# Patient Record
Sex: FEMALE | Race: WHITE
[De-identification: names, ages, dates, MRNs, and addresses within clinical notes are randomized per-mention and may not be internally consistent; named-entity substitution may affect disease eponyms.]

---

## 2017-12-15 ENCOUNTER — HOSPITAL ENCOUNTER (EMERGENCY)
Dept: HOSPITAL 76 - ED | Age: 82
Discharge: HOME | End: 2017-12-15
Payer: MEDICARE

## 2017-12-15 ENCOUNTER — HOSPITAL ENCOUNTER (OUTPATIENT)
Dept: HOSPITAL 76 - EMS | Age: 82
Discharge: ANOTHER HOSPITAL | End: 2017-12-15
Attending: SURGERY
Payer: MEDICARE

## 2017-12-15 VITALS — SYSTOLIC BLOOD PRESSURE: 171 MMHG | DIASTOLIC BLOOD PRESSURE: 121 MMHG

## 2017-12-15 DIAGNOSIS — E03.9: ICD-10-CM

## 2017-12-15 DIAGNOSIS — R42: Primary | ICD-10-CM

## 2017-12-15 DIAGNOSIS — E86.0: Primary | ICD-10-CM

## 2017-12-15 DIAGNOSIS — R94.31: ICD-10-CM

## 2017-12-15 DIAGNOSIS — I10: ICD-10-CM

## 2017-12-15 DIAGNOSIS — F03.90: ICD-10-CM

## 2017-12-15 DIAGNOSIS — I45.10: ICD-10-CM

## 2017-12-15 LAB
ALBUMIN/GLOB SERPL: 1 {RATIO} (ref 1–2.2)
ANION GAP SERPL CALCULATED.4IONS-SCNC: 13 MMOL/L (ref 6–13)
BASOPHILS NFR BLD AUTO: 0.1 10^3/UL (ref 0–0.1)
BASOPHILS NFR BLD AUTO: 0.8 %
BILIRUB BLD-MCNC: 0.2 MG/DL (ref 0.2–1)
BUN SERPL-MCNC: 27 MG/DL (ref 6–20)
CALCIUM UR-MCNC: 9.3 MG/DL (ref 8.5–10.3)
CHLORIDE SERPL-SCNC: 102 MMOL/L (ref 101–111)
CO2 SERPL-SCNC: 24 MMOL/L (ref 21–32)
CREAT SERPLBLD-SCNC: 1.3 MG/DL (ref 0.4–1)
CUL URINE ADD CHARGE: (no result)
EOSINOPHIL # BLD AUTO: 0.1 10^3/UL (ref 0–0.7)
EOSINOPHIL NFR BLD AUTO: 0.7 %
ERYTHROCYTE [DISTWIDTH] IN BLOOD BY AUTOMATED COUNT: 16 % (ref 12–15)
GFRSERPLBLD MDRD-ARVRAT: 39 ML/MIN/{1.73_M2} (ref 89–?)
GLOBULIN SER-MCNC: 4 G/DL (ref 2.1–4.2)
GLUCOSE SERPL-MCNC: 110 MG/DL (ref 70–100)
HCT VFR BLD AUTO: 32 % (ref 37–47)
HGB UR QL STRIP: 10.5 G/DL (ref 12–16)
LIPASE SERPL-CCNC: 36 U/L (ref 22–51)
LYMPHOCYTES # SPEC AUTO: 0.8 10^3/UL (ref 1.5–3.5)
LYMPHOCYTES NFR BLD AUTO: 9 %
MCH RBC QN AUTO: 27.7 PG (ref 27–31)
MCHC RBC AUTO-ENTMCNC: 32.7 G/DL (ref 32–36)
MCV RBC AUTO: 84.8 FL (ref 81–99)
MONOCYTES # BLD AUTO: 0.8 10^3/UL (ref 0–1)
MONOCYTES NFR BLD AUTO: 8.9 %
NEUTROPHILS # BLD AUTO: 6.9 10^3/UL (ref 1.5–6.6)
NEUTROPHILS # SNV AUTO: 8.5 X10^3/UL (ref 4.8–10.8)
NEUTROPHILS NFR BLD AUTO: 80.6 %
NRBC # BLD AUTO: 0.1 /100WBC
PDW BLD AUTO: 7.7 FL (ref 7.9–10.8)
PH UR STRIP.AUTO: 6 PH (ref 5–7.5)
POTASSIUM SERPL-SCNC: 4.4 MMOL/L (ref 3.5–5)
PROT SPEC-MCNC: 7.9 G/DL (ref 6.7–8.2)
RBC MAR: 3.78 10^6/UL (ref 4.2–5.4)
SODIUM SERPLBLD-SCNC: 139 MMOL/L (ref 135–145)
SP GR UR STRIP.AUTO: 1.01 (ref 1–1.03)
UA CHARGE (STRIP ONLY): YES
UA W/ MICROSCOPIC CHARGE: (no result)
UR CULTURE IF IND: (no result)
UROBILINOGEN UR STRIP.AUTO-MCNC: NEGATIVE MG/DL
WBC # BLD: 8.5 X10^3/UL

## 2017-12-15 PROCEDURE — 85025 COMPLETE CBC W/AUTO DIFF WBC: CPT

## 2017-12-15 PROCEDURE — 81003 URINALYSIS AUTO W/O SCOPE: CPT

## 2017-12-15 PROCEDURE — 93005 ELECTROCARDIOGRAM TRACING: CPT

## 2017-12-15 PROCEDURE — 87086 URINE CULTURE/COLONY COUNT: CPT

## 2017-12-15 PROCEDURE — 84484 ASSAY OF TROPONIN QUANT: CPT

## 2017-12-15 PROCEDURE — 80053 COMPREHEN METABOLIC PANEL: CPT

## 2017-12-15 PROCEDURE — 83690 ASSAY OF LIPASE: CPT

## 2017-12-15 PROCEDURE — 96360 HYDRATION IV INFUSION INIT: CPT

## 2017-12-15 PROCEDURE — 36415 COLL VENOUS BLD VENIPUNCTURE: CPT

## 2017-12-15 PROCEDURE — 99284 EMERGENCY DEPT VISIT MOD MDM: CPT

## 2017-12-15 PROCEDURE — 81001 URINALYSIS AUTO W/SCOPE: CPT

## 2017-12-15 NOTE — ED PHYSICIAN DOCUMENTATION
History of Present Illness





- Stated complaint


Stated Complaint: DIZZY





- Chief complaint


Chief Complaint: General





- History obtained from


History obtained from: Patient, Family





- History of Present Illness


Timing: Today





- Additonal information


Additional information: 





84-year-old female with history of dementia has called the ambulance today with 

a chief complaint of dizziness and not feeling well.  Now that she is here in 

the emergency department she cannot recall why she called the ambulance and she 

does not have any specific complaints.  She does state that she feels somewhat 

dehydrated and does not feel right.  The family is here with her and they 

indicate that she has refused assisted living so far and that she is 

incontinent of urine at night and does not eat and drink well.  They do bring 

her food they are at her home frequently and she does have a caregiver that 

comes in as well.





Review of Systems


Constitutional: denies: Fever, Chills, Myalgias, Fatigue


Eyes: denies: Decreased vision


Ears: denies: Ear pain


Nose: denies: Congestion


Throat: denies: Sore throat


Cardiac: denies: Chest pain / pressure, Palpitations


Respiratory: denies: Dyspnea, Cough


GI: denies: Abdominal Pain, Nausea, Vomiting


: denies: Dysuria, Frequency


Skin: denies: Rash


Musculoskeletal: denies: Neck pain, Back pain, Extremity pain


Neurologic: denies: Generalized weakness, Focal weakness, Numbness





PD PAST MEDICAL HISTORY





- Past Medical History


Cardiovascular: Hypertension


Neuro: None


Endocrine/Autoimmune: HyPOthyroidism





- Past Surgical History


Past Surgical History: No


Ortho: Knee replacement





- Present Medications


Home Medications: 


 Ambulatory Orders











 Medication  Instructions  Recorded  Confirmed


 


Famotidine 20 mg PO DAILY #30 tablet 09/19/16 12/15/17


 


Levothyroxine [Synthroid] 150 mcg ORAL DAILY 09/19/16 12/15/17


 


Simvastatin 20 mg ORAL DAILY 12/25/16 12/15/17


 


Valsartan 40 mg ORAL DAILY 12/25/16 12/15/17














- Allergies


Allergies/Adverse Reactions: 


 Allergies











Allergy/AdvReac Type Severity Reaction Status Date / Time


 


No Known Drug Allergies Allergy   Verified 12/25/16 12:40














- Social History


Does the pt smoke?: No


Smoking Status: Never smoker


Does the pt drink ETOH?: No


Does the pt have substance abuse?: No





- Immunizations


Immunizations are current?: Yes





PD ED PE NORMAL





- Vitals


Vital signs reviewed: Yes (Hypertensive)





- General


General: No acute distress, Well developed/nourished





- HEENT


HEENT: Atraumatic, PERRL, EOMI, Ears normal, Other (Dry mucous membranes)





- Neck


Neck: Supple, no meningeal sign, No bony TTP





- Cardiac


Cardiac: RRR, No murmur





- Respiratory


Respiratory: No respiratory distress, Clear bilaterally





- Abdomen


Abdomen: Soft, Non tender





- Back


Back: No CVA TTP, No spinal TTP





- Derm


Derm: Normal color, Warm and dry, No rash





- Extremities


Extremities: No deformity, No edema





- Neuro


Neuro: No motor deficit, No sensory deficit


Eye Opening: Spontaneous


Motor: Obeys Commands


Verbal: Oriented


GCS Score: 15





- Psych


Psych: Normal mood, Normal affect





Results





- Vitals


Vitals: 


 Vital Signs - 24 hr











  12/15/17 12/15/17 12/15/17





  09:47 10:42 12:02


 


Temperature 36.7 C 36.8 C 36.8 C


 


Heart Rate 64 65 70


 


Respiratory 17 24 20





Rate   


 


Blood Pressure 159/82 H 143/87 H 159/93 H


 


O2 Saturation 95 99 95








 Oxygen











O2 Source                      Room air

















- EKG (time done)


  ** 0954


Rate: Rate (enter#) (62)


Intervals: RBBB


QRS: LVH


Compare to prior EKG: Unchanged from prior EKG (12-25-16)


Computer interpretation: Agree with computer





- Labs


Labs: 


 Laboratory Tests











  12/15/17 12/15/17 12/15/17





  12:08 12:08 12:08


 


WBC  8.5  


 


RBC  3.78 L  


 


Hgb  10.5 L  


 


Hct  32.0 L  


 


MCV  84.8  


 


MCH  27.7  


 


MCHC  32.7  


 


RDW  16.0 H  


 


Plt Count  402  


 


MPV  7.7 L  


 


Neut #  6.9 H  


 


Lymph #  0.8 L  


 


Mono #  0.8  


 


Eos #  0.1  


 


Baso #  0.1  


 


Absolute Nucleated RBC  0.01  


 


Nucleated RBC %  0.1  


 


Sodium   139 


 


Potassium   4.4 


 


Chloride   102 


 


Carbon Dioxide   24 


 


Anion Gap   13.0 


 


BUN   27 H 


 


Creatinine   1.3 H 


 


Estimated GFR (MDRD)   39 L 


 


Glucose   110 H 


 


Calcium   9.3 


 


Total Bilirubin   0.2 


 


AST   20 


 


ALT   11 


 


Alkaline Phosphatase   131 H 


 


Troponin I    < 0.04


 


Total Protein   7.9 


 


Albumin   3.9 


 


Globulin   4.0 


 


Albumin/Globulin Ratio   1.0 


 


Lipase   36 


 


Urine Color   


 


Urine Clarity   


 


Urine pH   


 


Ur Specific Gravity   


 


Urine Protein   


 


Urine Glucose (UA)   


 


Urine Ketones   


 


Urine Occult Blood   


 


Urine Nitrite   


 


Urine Bilirubin   


 


Urine Urobilinogen   


 


Ur Leukocyte Esterase   


 


Ur Microscopic Review   


 


Urine Culture Comments   














  12/15/17





  12:35


 


WBC 


 


RBC 


 


Hgb 


 


Hct 


 


MCV 


 


MCH 


 


MCHC 


 


RDW 


 


Plt Count 


 


MPV 


 


Neut # 


 


Lymph # 


 


Mono # 


 


Eos # 


 


Baso # 


 


Absolute Nucleated RBC 


 


Nucleated RBC % 


 


Sodium 


 


Potassium 


 


Chloride 


 


Carbon Dioxide 


 


Anion Gap 


 


BUN 


 


Creatinine 


 


Estimated GFR (MDRD) 


 


Glucose 


 


Calcium 


 


Total Bilirubin 


 


AST 


 


ALT 


 


Alkaline Phosphatase 


 


Troponin I 


 


Total Protein 


 


Albumin 


 


Globulin 


 


Albumin/Globulin Ratio 


 


Lipase 


 


Urine Color  YELLOW


 


Urine Clarity  CLEAR


 


Urine pH  6.0


 


Ur Specific Gravity  1.015


 


Urine Protein  TRACE


 


Urine Glucose (UA)  NEGATIVE


 


Urine Ketones  NEGATIVE


 


Urine Occult Blood  TRACE-INTA


 


Urine Nitrite  NEGATIVE


 


Urine Bilirubin  NEGATIVE


 


Urine Urobilinogen  0.2 (NORMAL)


 


Ur Leukocyte Esterase  NEGATIVE


 


Ur Microscopic Review  NOT INDICATED


 


Urine Culture Comments  NOT INDICATED














Procedures





- IVC sono (time)


  ** 1150


Bedside IVC sono: IVC measures (cm) (1.19), IVC collapsed c insp (cm) (complete)

, Dehydration





PD MEDICAL DECISION MAKING





- ED course


Complexity details: reviewed old records, reviewed results, re-evaluated patient

, considered differential, d/w patient, d/w family


ED course: 





84-year-old female with a history of progressing dementia has come to the 

emergency department today with dizziness as a chief complaint.  She is found 

to be dehydrated on interrogation of the inferior vena cava and she is 

administered a liter of normal saline intravenously.





Departure





- Departure


Disposition: 01 Home, Self Care


Clinical Impression: 


 Dehydration





Condition: Stable


Instructions:  ED Dehydration


Follow-Up: 


Altaf Almendarez MD [Primary Care Provider] -

## 2018-04-14 ENCOUNTER — HOSPITAL ENCOUNTER (INPATIENT)
Dept: HOSPITAL 76 - ED | Age: 83
LOS: 5 days | Discharge: NURSING, REHAB OR LONG-TERM CARE | DRG: 194 | End: 2018-04-19
Attending: NURSE PRACTITIONER | Admitting: INTERNAL MEDICINE
Payer: MEDICARE

## 2018-04-14 ENCOUNTER — HOSPITAL ENCOUNTER (OUTPATIENT)
Dept: HOSPITAL 76 - EMS | Age: 83
Discharge: ANOTHER HOSPITAL | End: 2018-04-14
Attending: SURGERY
Payer: MEDICARE

## 2018-04-14 DIAGNOSIS — E03.9: ICD-10-CM

## 2018-04-14 DIAGNOSIS — F03.90: ICD-10-CM

## 2018-04-14 DIAGNOSIS — E87.1: ICD-10-CM

## 2018-04-14 DIAGNOSIS — R50.9: ICD-10-CM

## 2018-04-14 DIAGNOSIS — Z96.659: ICD-10-CM

## 2018-04-14 DIAGNOSIS — Z79.899: ICD-10-CM

## 2018-04-14 DIAGNOSIS — Z78.1: ICD-10-CM

## 2018-04-14 DIAGNOSIS — D50.9: ICD-10-CM

## 2018-04-14 DIAGNOSIS — N18.3: ICD-10-CM

## 2018-04-14 DIAGNOSIS — R05: Primary | ICD-10-CM

## 2018-04-14 DIAGNOSIS — J18.1: ICD-10-CM

## 2018-04-14 DIAGNOSIS — R09.02: Primary | ICD-10-CM

## 2018-04-14 DIAGNOSIS — I12.9: ICD-10-CM

## 2018-04-14 LAB
ALBUMIN DIAFP-MCNC: 3.1 G/DL (ref 3.2–5.5)
ALBUMIN/GLOB SERPL: 0.7 {RATIO} (ref 1–2.2)
ALP SERPL-CCNC: 122 IU/L (ref 42–121)
ALT SERPL W P-5'-P-CCNC: 15 IU/L (ref 10–60)
ANION GAP SERPL CALCULATED.4IONS-SCNC: 9 MMOL/L (ref 6–13)
AST SERPL W P-5'-P-CCNC: 17 IU/L (ref 10–42)
BASOPHILS NFR BLD AUTO: 0 10^3/UL (ref 0–0.1)
BASOPHILS NFR BLD AUTO: 0.2 %
BILIRUB BLD-MCNC: 0.6 MG/DL (ref 0.2–1)
BUN SERPL-MCNC: 25 MG/DL (ref 6–20)
CALCIUM UR-MCNC: 8.5 MG/DL (ref 8.5–10.3)
CHLORIDE SERPL-SCNC: 99 MMOL/L (ref 101–111)
CO2 SERPL-SCNC: 23 MMOL/L (ref 21–32)
CREAT SERPLBLD-SCNC: 1.6 MG/DL (ref 0.4–1)
EOSINOPHIL # BLD AUTO: 0 10^3/UL (ref 0–0.7)
EOSINOPHIL NFR BLD AUTO: 0.2 %
ERYTHROCYTE [DISTWIDTH] IN BLOOD BY AUTOMATED COUNT: 17.7 % (ref 12–15)
GFRSERPLBLD MDRD-ARVRAT: 31 ML/MIN/{1.73_M2} (ref 89–?)
GLOBULIN SER-MCNC: 4.3 G/DL (ref 2.1–4.2)
GLUCOSE SERPL-MCNC: 106 MG/DL (ref 70–100)
HGB UR QL STRIP: 8.5 G/DL (ref 12–16)
LIPASE SERPL-CCNC: 36 U/L (ref 22–51)
LYMPHOCYTES # SPEC AUTO: 0.7 10^3/UL (ref 1.5–3.5)
LYMPHOCYTES NFR BLD AUTO: 3.4 %
MCH RBC QN AUTO: 25.5 PG (ref 27–31)
MCHC RBC AUTO-ENTMCNC: 31.5 G/DL (ref 32–36)
MCV RBC AUTO: 81 FL (ref 81–99)
MONOCYTES # BLD AUTO: 1.8 10^3/UL (ref 0–1)
MONOCYTES NFR BLD AUTO: 8.9 %
NEUTROPHILS # BLD AUTO: 18.2 10^3/UL (ref 1.5–6.6)
NEUTROPHILS # SNV AUTO: 20.8 X10^3/UL (ref 4.8–10.8)
NEUTROPHILS NFR BLD AUTO: 87.3 %
PDW BLD AUTO: 7.9 FL (ref 7.9–10.8)
PLAT MORPH BLD: (no result)
PLATELET # BLD: 451 10^3/UL (ref 130–450)
PLATELET BLD QL SMEAR: (no result)
PROT SPEC-MCNC: 7.4 G/DL (ref 6.7–8.2)
RBC MAR: 3.32 10^6/UL (ref 4.2–5.4)
RBC MORPH BLD: (no result)
SODIUM SERPLBLD-SCNC: 131 MMOL/L (ref 135–145)

## 2018-04-14 PROCEDURE — 71045 X-RAY EXAM CHEST 1 VIEW: CPT

## 2018-04-14 PROCEDURE — 87086 URINE CULTURE/COLONY COUNT: CPT

## 2018-04-14 PROCEDURE — 87276 INFLUENZA A AG IF: CPT

## 2018-04-14 PROCEDURE — 85025 COMPLETE CBC W/AUTO DIFF WBC: CPT

## 2018-04-14 PROCEDURE — 86922 COMPATIBILITY TEST ANTIGLOB: CPT

## 2018-04-14 PROCEDURE — 99284 EMERGENCY DEPT VISIT MOD MDM: CPT

## 2018-04-14 PROCEDURE — 85379 FIBRIN DEGRADATION QUANT: CPT

## 2018-04-14 PROCEDURE — 86920 COMPATIBILITY TEST SPIN: CPT

## 2018-04-14 PROCEDURE — 83540 ASSAY OF IRON: CPT

## 2018-04-14 PROCEDURE — 86901 BLOOD TYPING SEROLOGIC RH(D): CPT

## 2018-04-14 PROCEDURE — 83690 ASSAY OF LIPASE: CPT

## 2018-04-14 PROCEDURE — 82270 OCCULT BLOOD FECES: CPT

## 2018-04-14 PROCEDURE — 87275 INFLUENZA B AG IF: CPT

## 2018-04-14 PROCEDURE — 96365 THER/PROPH/DIAG IV INF INIT: CPT

## 2018-04-14 PROCEDURE — 87040 BLOOD CULTURE FOR BACTERIA: CPT

## 2018-04-14 PROCEDURE — 83615 LACTATE (LD) (LDH) ENZYME: CPT

## 2018-04-14 PROCEDURE — 36415 COLL VENOUS BLD VENIPUNCTURE: CPT

## 2018-04-14 PROCEDURE — 82728 ASSAY OF FERRITIN: CPT

## 2018-04-14 PROCEDURE — 84481 FREE ASSAY (FT-3): CPT

## 2018-04-14 PROCEDURE — 80053 COMPREHEN METABOLIC PANEL: CPT

## 2018-04-14 PROCEDURE — 94640 AIRWAY INHALATION TREATMENT: CPT

## 2018-04-14 PROCEDURE — 81003 URINALYSIS AUTO W/O SCOPE: CPT

## 2018-04-14 PROCEDURE — 83605 ASSAY OF LACTIC ACID: CPT

## 2018-04-14 PROCEDURE — 71275 CT ANGIOGRAPHY CHEST: CPT

## 2018-04-14 PROCEDURE — 96375 TX/PRO/DX INJ NEW DRUG ADDON: CPT

## 2018-04-14 PROCEDURE — 84439 ASSAY OF FREE THYROXINE: CPT

## 2018-04-14 PROCEDURE — 86850 RBC ANTIBODY SCREEN: CPT

## 2018-04-14 PROCEDURE — 84466 ASSAY OF TRANSFERRIN: CPT

## 2018-04-14 PROCEDURE — 81001 URINALYSIS AUTO W/SCOPE: CPT

## 2018-04-14 PROCEDURE — 86870 RBC ANTIBODY IDENTIFICATION: CPT

## 2018-04-14 PROCEDURE — 84443 ASSAY THYROID STIM HORMONE: CPT

## 2018-04-14 PROCEDURE — 82607 VITAMIN B-12: CPT

## 2018-04-14 PROCEDURE — 99285 EMERGENCY DEPT VISIT HI MDM: CPT

## 2018-04-14 PROCEDURE — 83735 ASSAY OF MAGNESIUM: CPT

## 2018-04-14 PROCEDURE — 80048 BASIC METABOLIC PNL TOTAL CA: CPT

## 2018-04-14 PROCEDURE — 86900 BLOOD TYPING SEROLOGIC ABO: CPT

## 2018-04-14 PROCEDURE — 85044 MANUAL RETICULOCYTE COUNT: CPT

## 2018-04-14 PROCEDURE — 93306 TTE W/DOPPLER COMPLETE: CPT

## 2018-04-14 RX ADMIN — DEXTROSE AND SODIUM CHLORIDE SCH MLS/HR: 5; 900 INJECTION, SOLUTION INTRAVENOUS at 22:51

## 2018-04-14 RX ADMIN — SODIUM CHLORIDE, PRESERVATIVE FREE SCH ML: 5 INJECTION INTRAVENOUS at 23:58

## 2018-04-14 NOTE — XRAY PRELIMINARY REPORT
Accession: [id number]

Exam: XR CHEST 1 VIEW X-RAY

 

IMPRESSION: 

1. New right middle lobe density consistent with pneumonia, atelectasis or aspiration.

 

[facility]

 

SITE ID: [id number]

## 2018-04-14 NOTE — XRAY REPORT
EXAM: 

CHEST RADIOGRAPHY

 

EXAM DATE: 4/14/2018 07:16 PM.

 

CLINICAL HISTORY: Cough, fever.

 

COMPARISON: None.

 

TECHNIQUE: 1 view.

 

FINDINGS:

Lungs/Pleura: There is a new airspace opacity at the right middle lobe which obscures the contour of 
the right hemidiaphragm. The lung volumes are normal. The left lung appears clear.

 

Mediastinum: Within exam limitations, the cardiomediastinal contour is normal.

 

Other: None.

 

IMPRESSION: 

1. New right middle lobe density consistent with pneumonia, atelectasis or aspiration.

 

[facility]

Referring Provider Line: [phone]

 

SITE ID: [id number]

## 2018-04-14 NOTE — ED PHYSICIAN DOCUMENTATION
History of Present Illness





- Stated complaint


Stated Complaint: COUGH





- History obtained from


History obtained from: Patient, EMS





- History of Present Illness


Timing: How many days ago (10)


Pain level max: 0


Pain level now: 0


Improved by: rest


Worsened by: walking





- Additonal information


Additional information: 





Patient is an 84-year-old female who lives at home place, has severe dementia 

and has been coughing for at least the last 10 days.  Cough worsened today.  

Has had intermittent fevers.  Sent in for evaluation.  Unclear if she has had 

an x-ray or not.  Has not been on antibiotics.





Review of Systems


Unable to obtain: Dementia





PD PAST MEDICAL HISTORY





- Past Medical History


Cardiovascular: Hypertension


Neuro: None


Endocrine/Autoimmune: HyPOthyroidism





- Past Surgical History


Past Surgical History: No


Ortho: Knee replacement





- Present Medications


Home Medications: 


 Ambulatory Orders











 Medication  Instructions  Recorded  Confirmed


 


Famotidine 20 mg PO DAILY #30 tablet 09/19/16 12/15/17


 


Levothyroxine [Synthroid] 150 mcg ORAL DAILY 09/19/16 12/15/17


 


Simvastatin 20 mg ORAL DAILY 12/25/16 12/15/17


 


Valsartan 40 mg ORAL DAILY 12/25/16 12/15/17














- Allergies


Allergies/Adverse Reactions: 


 Allergies











Allergy/AdvReac Type Severity Reaction Status Date / Time


 


hydrocodone AdvReac Unknown Unknown Verified 04/14/18 19:16














- Social History


Does the pt smoke?: No


Smoking Status: Never smoker


Does the pt drink ETOH?: No


Does the pt have substance abuse?: No





- Immunizations


Immunizations are current?: Yes





PD ED PE NORMAL





- Vitals


Vital signs reviewed: Yes





- General


General: No acute distress, Other (Alert, appears in moderate respiratory 

distress. Not oriented to person place or time)





- HEENT


HEENT: Atraumatic, PERRL, Moist mucous membranes





- Neck


Neck: Supple, no meningeal sign





- Cardiac


Cardiac: RRR





- Respiratory


Respiratory: Other (Diminished breath sounds bilaterally, respiratory distress 

present)





- Abdomen


Abdomen: Soft, Non tender, Non distended





- Back


Back: No CVA TTP, No spinal TTP





- Derm


Derm: Warm and dry, No rash





- Extremities


Extremities: No edema, No calf tenderness / cord





- Neuro


Neuro: Other (Alert)





Results





- Vitals


Vitals: 


 Vital Signs - 24 hr











  04/14/18 04/14/18 04/14/18





  19:12 19:15 21:00


 


Temperature 36.8 C  37 C


 


Heart Rate 93 97 98


 


Respiratory 20 18 42 H





Rate   


 


Blood Pressure 157/76 H  98/62


 


O2 Saturation 93  95








 Oxygen











O2 Source                      Nasal cannula


 


Oxygen Flow Rate               2

















- Labs


Labs: 


 Laboratory Tests











  04/14/18 04/14/18 04/14/18





  19:30 19:30 19:30


 


WBC  20.8 H  


 


RBC  3.32 L  


 


Hgb  8.5 L  


 


Hct  26.8 L  


 


MCV  81.0  


 


MCH  25.5 L  


 


MCHC  31.5 L  


 


RDW  17.7 H  


 


Plt Count  451 H  


 


MPV  7.9  


 


Neut #  18.2 H  


 


Lymph #  0.7 L  


 


Mono #  1.8 H  


 


Eos #  0.0  


 


Baso #  0.0  


 


Absolute Nucleated RBC  0.00  


 


Nucleated RBC %  0.0  


 


Manual Slide Review  Indicated  


 


WBC Morphology  NORMAL APPEARANCE  


 


Platelet Estimate  INCREASED (>450,000)  


 


Platelet Morphology  NORMAL APPEARANCE  


 


RBC Morph Micro Appear  2+ OVALOCYTES  


 


Sodium   131 L 


 


Potassium   4.7 


 


Chloride   99 L 


 


Carbon Dioxide   23 


 


Anion Gap   9.0 


 


BUN   25 H 


 


Creatinine   1.6 H 


 


Estimated GFR (MDRD)   31 L 


 


Glucose   106 H 


 


Lactic Acid    0.8


 


Calcium   8.5 


 


Total Bilirubin   0.6 


 


AST   17 


 


ALT   15 


 


Alkaline Phosphatase   122 H 


 


Total Protein   7.4 


 


Albumin   3.1 L 


 


Globulin   4.3 H 


 


Albumin/Globulin Ratio   0.7 L 


 


Lipase   36 


 


Influenza A (Rapid)   


 


Influenza B (Rapid)   














  04/14/18





  20:50


 


WBC 


 


RBC 


 


Hgb 


 


Hct 


 


MCV 


 


MCH 


 


MCHC 


 


RDW 


 


Plt Count 


 


MPV 


 


Neut # 


 


Lymph # 


 


Mono # 


 


Eos # 


 


Baso # 


 


Absolute Nucleated RBC 


 


Nucleated RBC % 


 


Manual Slide Review 


 


WBC Morphology 


 


Platelet Estimate 


 


Platelet Morphology 


 


RBC Morph Micro Appear 


 


Sodium 


 


Potassium 


 


Chloride 


 


Carbon Dioxide 


 


Anion Gap 


 


BUN 


 


Creatinine 


 


Estimated GFR (MDRD) 


 


Glucose 


 


Lactic Acid 


 


Calcium 


 


Total Bilirubin 


 


AST 


 


ALT 


 


Alkaline Phosphatase 


 


Total Protein 


 


Albumin 


 


Globulin 


 


Albumin/Globulin Ratio 


 


Lipase 


 


Influenza A (Rapid)  Negative


 


Influenza B (Rapid)  Negative














- Rads (name of study)


  ** cxr


Radiology: Prelim report reviewed, EMP read contemporaneously, See rad report (

New right middle lobe density consistent with pneumonia, atelectasis or 

aspiration.)





PD MEDICAL DECISION MAKING





- ED course


Complexity details: reviewed results, re-evaluated patient, considered 

differential, d/w family


ED course: 





Patient is an 84-year-old female who presents to the emergency department with 

what appears to be a right middle lobe pneumonia.  Given a breathing treatment 

and respiratory distress improved.  She remained hypoxic and required 

supplemental oxygen.  Started on IV antibiotics.  Discussed the case with Dr. SHARMA

, hospitalist who accepts.





This document was made in part using voice recognition software. While efforts 

are made to proofread this document, sound alike and grammatical errors may 

occur.





Departure





- Departure


Disposition: [facility] DC/Xfer


Clinical Impression: 


 Hypoxia





Pneumonia


Qualifiers:


 Pneumonia type: due to unspecified organism Laterality: right Lung location: 

middle lobe of lung Qualified Code(s): J18.1 - Lobar pneumonia, unspecified 

organism





Condition: Stable


Discharge Date/Time: 04/14/18 22:23

## 2018-04-15 LAB
ANION GAP SERPL CALCULATED.4IONS-SCNC: 7 MMOL/L (ref 6–13)
BASOPHILS # BLD MANUAL: 0 10^3/UL (ref 0–0.1)
BASOPHILS NFR BLD AUTO: 0.4 %
BUN SERPL-MCNC: 27 MG/DL (ref 6–20)
CALCIUM UR-MCNC: 7.9 MG/DL (ref 8.5–10.3)
CHLORIDE SERPL-SCNC: 103 MMOL/L (ref 101–111)
CO2 SERPL-SCNC: 24 MMOL/L (ref 21–32)
CREAT SERPLBLD-SCNC: 1.5 MG/DL (ref 0.4–1)
EOSINOPHIL # BLD MANUAL: 0 10^3/UL (ref 0–0.7)
EOSINOPHIL NFR BLD AUTO: 0.2 %
ERYTHROCYTE [DISTWIDTH] IN BLOOD BY AUTOMATED COUNT: 18 % (ref 12–15)
GFRSERPLBLD MDRD-ARVRAT: 33 ML/MIN/{1.73_M2} (ref 89–?)
GLUCOSE SERPL-MCNC: 110 MG/DL (ref 70–100)
HGB UR QL STRIP: 7.1 G/DL (ref 12–16)
LYMPH ABN NFR BLD MANUAL: 0 %
LYMPHOBLASTS # BLD: 8 %
LYMPHOCYTES # BLD MANUAL: 1.3 10^3/UL (ref 1.5–3.5)
LYMPHOCYTES NFR BLD AUTO: 8.3 %
MANUAL DIF COMMENT BLD-IMP: (no result)
MCH RBC QN AUTO: 25.1 PG (ref 27–31)
MCHC RBC AUTO-ENTMCNC: 31 G/DL (ref 32–36)
MCV RBC AUTO: 80.9 FL (ref 81–99)
MONOCYTES # BLD MANUAL: 2.6 10^3/UL (ref 0–1)
MONOCYTES NFR BLD AUTO: 11.7 %
NEUTROPHILS # SNV AUTO: 16.1 X10^3/UL (ref 4.8–10.8)
NEUTROPHILS NFR BLD AUTO: 79.4 %
NEUTROPHILS NFR BLD MANUAL: 12.2 10^3/UL (ref 1.5–6.6)
NEUTS BAND NFR BLD MANUAL: 76 %
NEUTS BAND NFR BLD: 0 %
PDW BLD AUTO: 7.8 FL (ref 7.9–10.8)
PLATELET # BLD: 393 10^3/UL (ref 130–450)
PLATELET BLD QL SMEAR: (no result)
RBC MAR: 2.82 10^6/UL (ref 4.2–5.4)
RBC MORPH BLD: (no result)
SODIUM SERPLBLD-SCNC: 134 MMOL/L (ref 135–145)

## 2018-04-15 RX ADMIN — LEVOTHYROXINE SODIUM SCH MCG: 75 TABLET ORAL at 06:20

## 2018-04-15 RX ADMIN — FAMOTIDINE SCH MG: 20 TABLET, FILM COATED ORAL at 10:01

## 2018-04-15 RX ADMIN — SODIUM CHLORIDE SCH MLS/HR: 9 INJECTION, SOLUTION INTRAVENOUS at 11:53

## 2018-04-15 RX ADMIN — POLYETHYLENE GLYCOL 3350 SCH GM: 17 POWDER, FOR SOLUTION ORAL at 10:01

## 2018-04-15 RX ADMIN — SODIUM CHLORIDE, PRESERVATIVE FREE PRN ML: 5 INJECTION INTRAVENOUS at 05:26

## 2018-04-15 RX ADMIN — ATORVASTATIN CALCIUM SCH MG: 10 TABLET, FILM COATED ORAL at 20:47

## 2018-04-15 RX ADMIN — IPRATROPIUM BROMIDE AND ALBUTEROL SULFATE SCH ML: 2.5; .5 SOLUTION RESPIRATORY (INHALATION) at 19:10

## 2018-04-15 RX ADMIN — SODIUM CHLORIDE, PRESERVATIVE FREE SCH: 5 INJECTION INTRAVENOUS at 16:40

## 2018-04-15 RX ADMIN — DEXTROSE AND SODIUM CHLORIDE SCH MLS/HR: 5; 900 INJECTION, SOLUTION INTRAVENOUS at 22:04

## 2018-04-15 RX ADMIN — SODIUM CHLORIDE SCH MLS/HR: 9 INJECTION, SOLUTION INTRAVENOUS at 23:51

## 2018-04-15 RX ADMIN — SODIUM CHLORIDE, PRESERVATIVE FREE SCH: 5 INJECTION INTRAVENOUS at 10:02

## 2018-04-15 RX ADMIN — DEXTROSE AND SODIUM CHLORIDE SCH MLS/HR: 5; 900 INJECTION, SOLUTION INTRAVENOUS at 10:01

## 2018-04-15 RX ADMIN — SODIUM CHLORIDE SCH MLS/HR: 9 INJECTION, SOLUTION INTRAVENOUS at 17:08

## 2018-04-15 NOTE — PROVIDER PROGRESS NOTE
Assessment/Plan





- Problem List


(1) Right middle lobe pneumonia


Qualifiers: 


   Pneumonia type: aspiration pneumonia 


Assessment/Plan: 


Imaging upon arrival to the ED suggests acute pneumonia noted in the right lung

, likely aspiration.  The patient is known to have advanced dementia and is 

slowly declining with her ambulation.  She has been very lethargic, had a cough

, fever and her WBCs were elevated at 20.8.  


Plan:  Continue IV antibiotics, respiratory care including nebs, and a flutter 

valve.  








(2) Essential hypertension


Assessment/Plan: 


The patient takes Valsartan at home and has had known HTN for several years.  

Blood pressure today was 149/84.  


Plan:  Continue to monitor vital signs and labs.  Continue home meds if 

swallowing is ok.








(3) Hypothyroidism


Assessment/Plan: 


The patient has a known history of this and her last TSH was not found in a 

chart review.  She takes Synthroid 150mcg at home daily, which will be 

continued.


Plan:  Consider checking a TSH and adjust as needed.








(4) CKD (chronic kidney disease) stage 3, GFR 30-59 ml/min


Assessment/Plan: 


The patient has a known history of kidney disease and she has a creatinine base 

line of 1.1.  Today her creatinine was elevated at 1.5, GFR was 33.  This can 

be explained by her acute illness causing dehydration.  She takes a low dose 

ARB at home, that may be adjusted.


Plan:  Continue to monitor labs, give gentle IV fluids and avoid nephro-toxic 

medications.








(5) Advanced dementia


Assessment/Plan: 


After meeting in the room with the patient's son who is the POA, his mother has 

been progressive in her dementia in the past few years.  She seems to recognize 

her son, but cannot follow commands and now is unable to protect her air way.


Plan:  Continue current code status of DNR and treat acute illness in hopes to 

regain strength.








(6) Anemia


Qualifiers: 


   Anemia type: iron deficiency   Iron deficiency anemia type: unspecified iron 

deficiency   Qualified Code(s): D50.9 - Iron deficiency anemia, unspecified   


Assessment/Plan: 


The patient has a known history of this, but it appears that she is no longer 

on iron supplementation at home.  H/H were low at 7.1/22.8.  This may be lower 

than when check at the time of admission due to the patient getting IVFs.  The 

patient likely has decreased nutrition.


Plan:  Continue to monitor labs and watch for signs of bleeding.








- Current Meds


Current Meds: 





 Current Medications











Generic Name Dose Route Start Last Admin





  Trade Name Freq  PRN Reason Stop Dose Admin


 


Guaifenesin/Codeine Phosphate  5 ml  04/14/18 22:45  04/15/18 04:42





  Robitussin Ac  PO   5 ml





  Q6H ALEXEY   Administration


 


Dextrose/Sodium Chloride  1,000 mls @ 100 mls/hr  04/14/18 22:00  04/15/18 06:23





  D5ns  IV   100 mls/hr





  .Q10H ALEXEY   Infusion


 


Levothyroxine Sodium  150 mcg  04/15/18 07:00  04/15/18 06:20





  Synthroid  PO   150 mcg





  QDAC ALEXEY   Administration


 


Sodium Chloride  10 ml  04/14/18 21:12  04/15/18 05:26





  Normal Saline Flush 0.9%  IVP   10 ml





  PRN PRN   Administration





  AS NEEDED PER PROVIDER ORDERS   


 


Sodium Chloride  10 ml  04/15/18 01:00  04/14/18 23:58





  Normal Saline Flush 0.9%  IVP   10 ml





  0100,0900,1700 ALEXEY   Administration














- Lab Result


Lab results reviewed: Yes


Fish Bone Diagrams: 


 04/19/18 04:50





 04/19/18 04:50





- EKG Results


EKG Interpreted Independently: Yes





- Diagnostic Imaging Results


Diagnostic Imaging Results: Prelim report reviewed, Final report reviewed


Diagnostic Imaging Results Comments: 


EXAM: CHEST RADIOGRAPHY 


EXAM DATE: 4/14/2018 07:16 PM. 


CLINICAL HISTORY: Cough, fever. 


COMPARISON: None. 


TECHNIQUE: 1 view. 





FINDINGS: Lungs/Pleura: There is a new airspace opacity at the right middle 

lobe which obscures the contour of the right hemidiaphragm. The lung volumes 

are normal. The left lung appears clear. 


Mediastinum: Within exam limitations, the cardiomediastinal contour is normal. 


Other: None. 


IMPRESSION: 1. New right middle lobe density consistent with pneumonia, 

atelectasis or aspiration. 














- Additional Planning


Condition/Complexity: Stable


My Orders: 





My Active Orders





04/15/18 09:00


Piperacillin/Tazobactam [Zosyn] 3.375 gm   Sodium Chloride 0.9% Minibag [Normal 

Saline 0.9% Minibag] 100 ml IV Q6H 











Plan Discussed with:: Patient, Family, Power of 


Time Spent: 15-30 minutes





Subjective





- Subjective


Patient Reports: Cough, Shortness of Breath


Nursing Reports: Confused (baseline advanced dementia-lives in a memory care 

facility.), Cough





Objective


Vital Signs: 





 Vital Signs - 24 hr











  04/14/18 04/14/18 04/14/18





  22:03 22:15 22:33


 


Temperature  36.4 C L 36.4 C L


 


Heart Rate 92  


 


Heart Rate [  114 H 100





Brachial]   


 


Respiratory 29 H 29 H 28 H





Rate   


 


Blood Pressure 105/57 L  


 


Blood Pressure   131/56 H





[Left Brachial   





artery]   


 


Blood Pressure  131/56 H 





[Right Brachial   





artery]   


 


O2 Saturation 96 93 98














  04/14/18 04/15/18 04/15/18





  23:48 00:16 03:08


 


Temperature 36.4 C L  


 


Heart Rate   


 


Heart Rate [ 96 86 





Brachial]   


 


Respiratory 36 H 26 H 





Rate   


 


Blood Pressure   


 


Blood Pressure   





[Left Brachial   





artery]   


 


Blood Pressure 109/49 L  





[Right Brachial   





artery]   


 


O2 Saturation 92 97 96














  04/15/18 04/15/18





  05:21 07:53


 


Temperature 36.4 C L 36.6 C


 


Heart Rate  


 


Heart Rate [ 71 82





Brachial]  


 


Respiratory 28 H 20





Rate  


 


Blood Pressure  


 


Blood Pressure  





[Left Brachial  





artery]  


 


Blood Pressure 124/98 H 123/89 H





[Right Brachial  





artery]  


 


O2 Saturation 94 95








 Oxygen











O2 Source                      Room air














I&O (Last 24 Hrs): 





 Intake and Output Totals x24h











 04/13/18 04/14/18 04/15/18





 23:59 23:59 23:59


 


Intake Total  250 753.333


 


Balance  250 753.333











General: Alert, Cooperative, Mild distress


HEENT: Atraumatic, PERRLA


Neck: Supple, No JVD, No thyromegaly


Lymphatic: no adenopathy


Neuro: Alert, Disoriented, Focal Deficits


Cardiovascular: Regular rate


Respiratory: Chest non-tender, Wheezes, Rales, Rhonchi


Abdomen: Normal bowel sounds, Soft, No tenderness, No masses


Extremities: No clubbing, No edema, No tenderness/swelling


Skin: No rashes, No breakdown, No significant lesion


Comments/Notes: 


Lesion noted on left buttock that is intact, and appears like it is from being 

sweaty and has a "pimple appearance".  It does not resemble a shingles like 

lesion, but I will monitor this.





- Results


Results: 





 Laboratory Results











WBC  16.1 x10^3/uL (4.8-10.8)  H  04/15/18  05:45    


 


RBC  2.82 10^6/uL (4.20-5.40)  L  04/15/18  05:45    


 


Hgb  7.1 g/dL (12.0-16.0)  L  04/15/18  05:45    


 


Hct  22.8 % (37.0-47.0)  L  04/15/18  05:45    


 


MCV  80.9 fL (81.0-99.0)  L  04/15/18  05:45    


 


MCH  25.1 pg (27.0-31.0)  L  04/15/18  05:45    


 


MCHC  31.0 g/dL (32.0-36.0)  L  04/15/18  05:45    


 


RDW  18.0 % (12.0-15.0)  H  04/15/18  05:45    


 


Plt Count  393 10^3/uL (130-450)   04/15/18  05:45    


 


MPV  7.8 fL (7.9-10.8)  L  04/15/18  05:45    


 


Neut #  Not Reportable   04/15/18  05:45    


 


Lymph #  Not Reportable   04/15/18  05:45    


 


Mono #  Not Reportable   04/15/18  05:45    


 


Eos #  Not Reportable   04/15/18  05:45    


 


Baso #  Not Reportable   04/15/18  05:45    


 


Absolute Nucleated RBC  Not Reportable   04/15/18  05:45    


 


Total Counted  100   04/15/18  05:45    


 


Band Neuts % (Manual)  0 % (0-10)  04/15/18  05:45    


 


Abnorm Lymph % (Manual)  0 %  04/15/18  05:45    


 


Nucleated RBC %  Not Reportable   04/15/18  05:45    


 


Neutrophils # (Manual)  12.2 10^3/uL (1.5-6.6)  H  04/15/18  05:45    


 


Lymphocytes # (Manual)  1.3 10^3/uL (1.5-3.5)  L  04/15/18  05:45    


 


Monocytes # (Manual)  2.6 10^3/uL (0.0-1.0)  H  04/15/18  05:45    


 


Eosinophils # (Manual)  0.0 10^3/uL (0-0.7)   04/15/18  05:45    


 


Basophils # (Manual)  0.0 10^3/uL (0-0.1)   04/15/18  05:45    


 


Differential Comment  MANUAL DIFFERENTIAL   04/15/18  05:45    


 


Manual Slide Review  Indicated   04/14/18  19:30    


 


WBC Morphology  NORMAL APPEARANCE  (NORMAL)   04/14/18  19:30    


 


Platelet Estimate  NORMAL (130-450,000)  (NORMAL)   04/15/18  05:45    


 


Platelet Morphology  NORMAL APPEARANCE  (NORMAL)   04/14/18  19:30    


 


RBC Morph Micro Appear  2+  ANISOCYTOSIS  (NORMAL) 1+ POIKILOCYTOSIS  (NORMAL) 1

+ POLYCHROMASIA  (NORMAL) 2+ HYPOCHROMASIA  (NORMAL) 2+ OVALOCYTES  (NORMAL)   

04/14/18  19:30    


 


RBC Morph Micro Appear  2+  ANISOCYTOSIS  (NORMAL) 1+ POIKILOCYTOSIS  (NORMAL) 1

+ POLYCHROMASIA  (NORMAL) 2+ HYPOCHROMASIA  (NORMAL) 2+ OVALOCYTES  (NORMAL)   

04/14/18  19:30    


 


RBC Morph Micro Appear  2+  ANISOCYTOSIS  (NORMAL) 1+ POIKILOCYTOSIS  (NORMAL) 1

+ POLYCHROMASIA  (NORMAL) 2+ HYPOCHROMASIA  (NORMAL) 2+ OVALOCYTES  (NORMAL)   

04/14/18  19:30    


 


RBC Morph Micro Appear  2+  ANISOCYTOSIS  (NORMAL) 1+ POIKILOCYTOSIS  (NORMAL) 1

+ POLYCHROMASIA  (NORMAL) 2+ HYPOCHROMASIA  (NORMAL) 2+ OVALOCYTES  (NORMAL)   

04/14/18  19:30    


 


RBC Morph Micro Appear  1+ ANISOCYTOSIS  (NORMAL) 1+ MICROCYTOSIS  (NORMAL) 2+ 

HYPOCHROMASIA  (NORMAL) 1+ OVALOCYTES  (NORMAL)   04/15/18  05:45    


 


RBC Morph Micro Appear  1+ ANISOCYTOSIS  (NORMAL) 1+ MICROCYTOSIS  (NORMAL) 2+ 

HYPOCHROMASIA  (NORMAL) 1+ OVALOCYTES  (NORMAL)   04/15/18  05:45    


 


RBC Morph Micro Appear  1+ ANISOCYTOSIS  (NORMAL) 1+ MICROCYTOSIS  (NORMAL) 2+ 

HYPOCHROMASIA  (NORMAL) 1+ OVALOCYTES  (NORMAL)   04/15/18  05:45    


 


RBC Morph Micro Appear  1+ ANISOCYTOSIS  (NORMAL) 1+ MICROCYTOSIS  (NORMAL) 2+ 

HYPOCHROMASIA  (NORMAL) 1+ OVALOCYTES  (NORMAL)   04/15/18  05:45    


 


Sodium  134 mmol/L (135-145)  L  04/15/18  05:45    


 


Potassium  4.2 mmol/L (3.5-5.0)   04/15/18  05:45    


 


Chloride  103 mmol/L (101-111)   04/15/18  05:45    


 


Carbon Dioxide  24 mmol/L (21-32)   04/15/18  05:45    


 


Anion Gap  7.0  (6-13)   04/15/18  05:45    


 


BUN  27 mg/dL (6-20)  H  04/15/18  05:45    


 


Creatinine  1.5 mg/dL (0.4-1.0)  H  04/15/18  05:45    


 


Estimated GFR (MDRD)  33  (>89)  L  04/15/18  05:45    


 


Glucose  110 mg/dL ()  H  04/15/18  05:45    


 


Lactic Acid  0.8 mmol/L (0.5-2.2)   04/14/18  19:30    


 


Calcium  7.9 mg/dL (8.5-10.3)  L  04/15/18  05:45    


 


Total Bilirubin  0.6 mg/dL (0.2-1.0)   04/14/18  19:30    


 


AST  17 IU/L (10-42)   04/14/18  19:30    


 


ALT  15 IU/L (10-60)   04/14/18  19:30    


 


Alkaline Phosphatase  122 IU/L ()  H  04/14/18  19:30    


 


Total Protein  7.4 g/dL (6.7-8.2)   04/14/18  19:30    


 


Albumin  3.1 g/dL (3.2-5.5)  L  04/14/18  19:30    


 


Globulin  4.3 g/dL (2.1-4.2)  H  04/14/18  19:30    


 


Albumin/Globulin Ratio  0.7  (1.0-2.2)  L  04/14/18  19:30    


 


Lipase  36 U/L (22-51)   04/14/18  19:30    


 


Influenza A (Rapid)  Negative  (Negative)   04/14/18  20:50    


 


Influenza B (Rapid)  Negative  (Negative)   04/14/18  20:50

## 2018-04-15 NOTE — HISTORY & PHYSICAL EXAMINATION
DATE OF SERVICE: 04/14/2018

Physician: Christie Bess MD

 

HISTORY OF PRESENT ILLNESS:  This is an 84-year-old, white female with a 
history of significant

dementia.  The patient is unable to communicate with me and the history was 
given to the 

emergency room doctor by her son.  Patient lives in a facility.  She has had a 
cough that has 

been productive for approximately 1-2 weeks that she was only getting cough 
suppressants for.  

With this, she presented to the emergency room and was found to have a pneumonia
, and is being 

admitted for management of her respiratory status and hypoxia.

 

ALLERGIES:  HYDROCODONE.

 

MEDICATIONS

1.  Famotidine 20 mg daily.

2.  Levothyroxine 150 mcg daily.

3.  Simvastatin 20 mg daily.

4.  Valsartan 40 mg daily.

 

REVIEW OF SYSTEMS:  A comprehensive review of systems was done by chart review 
and the 

pertinent positives are above.

 

FAMILY HISTORY:  No inherited diseases.

 

SOCIAL HISTORY:  No cigarette smoking.  No alcohol use.  No illicit drug use.

 

PAST MEDICAL HISTORY:  Dementia, hypertension, hypothyroidism.

 

PHYSICAL EXAMINATION

GENERAL:  Thin, white female.  She responds to her name and is able to move all 
4 extremities 

independently, but does not communicate verbally. She has a wet cough.

VITAL SIGNS:  Blood pressure 109/50.  Heart rate is .  She is afebrile. 
Oxygen 

saturation is 93% on room air and increases to 98% on 2 liters nasal cannula.

HEENT:  Shows dry oral mucosa.

NECK:  Shows no JVD or carotid bruits.

LUNGS:  Diminished breath sounds.  No rales or wheezes.

HEART:  Heart sounds are distant.  No audible murmur.

ABDOMEN:  Soft, not distended.

EXTREMITIES:  Show no clubbing, cyanosis or edema.

NEUROLOGIC:  Her dementia precludes an accurate assessment for focal findings.

 

LABORATORIES:  Sodium 131, potassium 4.7, BUN 25, creatinine 1.6, lactic acid 
0.8.  Normal 

liver tests.  Albumin 3.1.  Influenza A and B are negative.  White blood count 
20.8 with a left

shift, hemoglobin 8.5 (her baseline Hgb is 9) and platelet count 451.

 

Chest x-ray:  Right middle lobe infiltrate.  



No EKG was done.

 

IMPRESSION/DIAGNOSES

1.  Healthcare-associated pneumonia of the right middle lobe, does suggest 
possible aspiration.

2.  Dementia.

3.  Hypertension.

4.  Hypothyroidism.

5.  Kidney injury.

6.  Hyponatremia.

7.  Anemia.

 

PLAN:  Admit the patient.  Treat her pneumonia with ceftriaxone and 
azithromycin IV, and start 

cough suppressant.  Add supplemental oxygen.  If needed, soft wrist restraints 
will be used so 

she can keep the nasal cannula on.  Continue with her medications for 
hypothyroidism.  If blood 

pressure runs low or borderline, then the valsartan can be held for a period of 
time.  Begin IV fluids. 

Obtain a swallowing evaluation to determine if she has an aspiration risk, and 
then treat for 

presumed aspiration pneumonia.

 

CODE STATUS:  DNR.

 

DEEP VENOUS THROMBOSIS PROPHYLAXIS:  SCDs.

 

ATTESTATION:  The patient is expected to be discharged or transferred to 
another facility 

within 96 hours:  Yes.

 

 

DD: 04/15/2018 00:06

TD: 04/15/2018 03:15

Job #: [id number]

HERMILA

## 2018-04-16 LAB
CLARITY UR REFRACT.AUTO: CLEAR
GLUCOSE UR QL STRIP.AUTO: NEGATIVE MG/DL
KETONES UR QL STRIP.AUTO: NEGATIVE MG/DL
NITRITE UR QL STRIP.AUTO: NEGATIVE
PH UR STRIP.AUTO: 5.5 PH (ref 5–7.5)
PROT UR STRIP.AUTO-MCNC: NEGATIVE MG/DL
RBC # UR STRIP.AUTO: (no result) /UL
SP GR UR STRIP.AUTO: 1.02 (ref 1–1.03)
UROBILINOGEN UR QL STRIP.AUTO: (no result) E.U./DL
UROBILINOGEN UR STRIP.AUTO-MCNC: NEGATIVE MG/DL

## 2018-04-16 RX ADMIN — SODIUM CHLORIDE SCH MLS/HR: 9 INJECTION, SOLUTION INTRAVENOUS at 05:07

## 2018-04-16 RX ADMIN — SODIUM CHLORIDE SCH MLS/HR: 9 INJECTION, SOLUTION INTRAVENOUS at 18:02

## 2018-04-16 RX ADMIN — IPRATROPIUM BROMIDE AND ALBUTEROL SULFATE SCH ML: 2.5; .5 SOLUTION RESPIRATORY (INHALATION) at 13:23

## 2018-04-16 RX ADMIN — LEVOTHYROXINE SODIUM SCH MCG: 75 TABLET ORAL at 06:00

## 2018-04-16 RX ADMIN — POLYETHYLENE GLYCOL 3350 SCH GM: 17 POWDER, FOR SOLUTION ORAL at 08:17

## 2018-04-16 RX ADMIN — SODIUM CHLORIDE SCH MLS/HR: 9 INJECTION, SOLUTION INTRAVENOUS at 12:51

## 2018-04-16 RX ADMIN — SODIUM CHLORIDE, PRESERVATIVE FREE SCH ML: 5 INJECTION INTRAVENOUS at 18:02

## 2018-04-16 RX ADMIN — FAMOTIDINE SCH MG: 20 TABLET, FILM COATED ORAL at 08:17

## 2018-04-16 RX ADMIN — IPRATROPIUM BROMIDE AND ALBUTEROL SULFATE SCH ML: 2.5; .5 SOLUTION RESPIRATORY (INHALATION) at 07:58

## 2018-04-16 RX ADMIN — SODIUM CHLORIDE, PRESERVATIVE FREE SCH: 5 INJECTION INTRAVENOUS at 05:08

## 2018-04-16 RX ADMIN — IPRATROPIUM BROMIDE AND ALBUTEROL SULFATE SCH ML: 2.5; .5 SOLUTION RESPIRATORY (INHALATION) at 20:13

## 2018-04-16 RX ADMIN — ATORVASTATIN CALCIUM SCH MG: 10 TABLET, FILM COATED ORAL at 21:05

## 2018-04-16 RX ADMIN — DEXTROSE AND SODIUM CHLORIDE SCH MLS/HR: 5; 900 INJECTION, SOLUTION INTRAVENOUS at 09:11

## 2018-04-16 RX ADMIN — SODIUM CHLORIDE, PRESERVATIVE FREE SCH: 5 INJECTION INTRAVENOUS at 08:17

## 2018-04-16 RX ADMIN — DEXTROSE AND SODIUM CHLORIDE SCH MLS/HR: 5; 900 INJECTION, SOLUTION INTRAVENOUS at 21:05

## 2018-04-16 NOTE — PROVIDER PROGRESS NOTE
Assessment/Plan





- Problem List


(1) Right middle lobe pneumonia


Qualifiers: 


   Pneumonia type: aspiration pneumonia 


Assessment/Plan: 


Imaging upon arrival to the ED suggests acute pneumonia noted in the right lung

, likely aspiration.  The patient is known to have advanced dementia and is 

slowly declining with her ambulation.  She has been very lethargic, had a 

productive cough, fever and her WBCs were elevated at 20.8, that are now down 

to 16.1.  A sputum sample has been difficult to obtain due to patient's mental 

status.  


Plan:  Continue IV antibiotics, respiratory care including nebs, and a flutter 

valve.  








(2) Essential hypertension


Assessment/Plan: 


The patient takes Valsartan at home and has had known HTN for several years.  

Blood pressure today was 186/87, which may be due to the patient's inability to 

swallow.  May consider IV medications such as hydralazine if the patient 

remains hypertensive.  


Plan:  Continue to monitor vital signs and labs.  Continue home meds if 

swallowing is ok.








(3) Hypothyroidism


Assessment/Plan: 


The patient has a known history of this and her last TSH was not found in a 

chart review.  She takes Synthroid 150mcg at home daily, which will be 

continued.


Plan:  TSH is pending, so await results and adjust as needed.











(4) CKD (chronic kidney disease) stage 3, GFR 30-59 ml/min


Assessment/Plan: 


The patient has a known history of kidney disease and she has a creatinine base 

line of 1.1.  Today her creatinine was elevated at 1.5, upon admission that is 

now 1.4, GFR was 33.  This can be explained by her acute illness causing 

dehydration.  She takes a low dose ARB at home, that may be adjusted.


Plan:  Continue to monitor labs, give gentle IV fluids and avoid nephro-toxic 

medications.








(5) Advanced dementia


Assessment/Plan: 


The patient's son who is the POA, his mother has been progressive in her 

dementia in the past few years.  She seems to recognize her son, but cannot 

follow commands and now is unable to protect her air way.  The patient is still 

very lethargic today upon exam.


Plan:  Continue current code status of DNR and treat acute illness in hopes to 

regain strength.








(6) Anemia


Qualifiers: 


   Anemia type: iron deficiency   Iron deficiency anemia type: unspecified iron 

deficiency   Qualified Code(s): D50.9 - Iron deficiency anemia, unspecified   


Assessment/Plan: 


The patient has a known history of this, but it appears that she is no longer 

on iron supplementation at home.  H/H were low at 7.1/22.8.  This may be lower 

than when check at the time of admission due to the patient getting IVFs.  The 

patient likely has decreased nutrition.


Plan:  Continue to monitor labs, iron studies are pending, and watch for signs 

of bleeding.











- Current Meds


Current Meds: 





 Current Medications











Generic Name Dose Route Start Last Admin





  Trade Name Freq  PRN Reason Stop Dose Admin


 


Albuterol/Ipratropium  3 ml  04/15/18 19:00  04/16/18 13:23





  Duoneb  INH   3 ml





  RTTID ALEXEY   Administration


 


Atorvastatin Calcium  10 mg  04/15/18 21:00  04/15/18 20:47





  Lipitor  PO   10 mg





  QPM ALEXEY   Administration


 


Famotidine  20 mg  04/15/18 09:00  04/16/18 08:17





  Pepcid  PO   20 mg





  DAILY ALEXEY   Administration


 


Guaifenesin/Codeine Phosphate  5 ml  04/14/18 22:45  04/16/18 11:04





  Robitussin Ac  PO   5 ml





  Q6H ALEXEY   Administration


 


Dextrose/Sodium Chloride  1,000 mls @ 100 mls/hr  04/14/18 22:00  04/16/18 09:11





  D5ns  IV   100 mls/hr





  .Q10H ALEXEY   Administration


 


Ampicillin Sodium/Sulbactam  100 mls @ 200 mls/hr  04/15/18 12:00  04/16/18 13:

21





  Sodium 3 gm/ Sodium Chloride  IV   Infused





  Q6HR ALEXEY   Infusion


 


Levothyroxine Sodium  150 mcg  04/15/18 07:00  04/16/18 06:00





  Synthroid  PO   150 mcg





  QDAC ALEXEY   Administration


 


Ondansetron HCl  4 mg  04/14/18 21:12  04/16/18 11:03





  Zofran Inj  IVP   4 mg





  Q6HR PRN   Administration





  Nausea / Vomiting   


 


Polyethylene Glycol  17 gm  04/15/18 09:00  04/16/18 08:17





  Miralax  PO   17 gm





  DAILY ALEXEY   Administration


 


Sodium Chloride  10 ml  04/14/18 21:12  04/15/18 05:26





  Normal Saline Flush 0.9%  IVP   10 ml





  PRN PRN   Administration





  AS NEEDED PER PROVIDER ORDERS   


 


Sodium Chloride  10 ml  04/15/18 01:00  04/16/18 08:17





  Normal Saline Flush 0.9%  IVP   Not Given





  0100,0900,1700 ALEXEY   














- Lab Result


Lab results reviewed: Yes


Fish Bone Diagrams: 


 04/19/18 04:50





 04/19/18 04:50





- Diagnostic Imaging Results


Diagnostic Imaging Results: Prelim report reviewed, Final report reviewed





- Additional Planning


Condition/Complexity: Stable


My Orders: 





My Active Orders





04/15/18 16:14


Pure Wick [Female External Catheter Care] [] QSHIFT 





04/15/18 16:51


Nebulizer/MDI Tx. [RC] .TID/Q4PRN 


Resp Teach Nebulizer/MDI [RC] .ONCE 





04/15/18 19:00


Ipratropium/Albuterol [Duoneb]   3 ml INH RTQ4H PRN 


Ipratropium/Albuterol [Duoneb]   3 ml INH RTTID 





04/16/18 Dinner


Dysphagia Puree Diet [DIET] 











Plan Discussed with:: Patient, Family (son upated in person)


Time Spent: 15-30 minutes





Subjective





- Subjective


Patient Reports: No Complaints (Son and daughter-in-law were present for exam 

today.  Questions answered.)


Nursing Reports: Confused, Cough





Objective


Vital Signs: 





 Vital Signs - 24 hr











  04/15/18 04/15/18 04/15/18





  18:25 19:10 19:34


 


Temperature   


 


Heart Rate  96 


 


Heart Rate [ 89  91





Brachial]   


 


Heart Rate [   





Supine]   


 


Respiratory 22 18 28 H





Rate   


 


Blood Pressure   





[Left Brachial   





artery]   


 


Blood Pressure   





[Supine]   


 


O2 Saturation 99  100














  04/15/18 04/15/18 04/16/18





  21:45 23:35 04:16


 


Temperature  37.4 C 


 


Heart Rate   


 


Heart Rate [ 96 99 





Brachial]   


 


Heart Rate [   





Supine]   


 


Respiratory 24 20 19





Rate   


 


Blood Pressure  149/84 H 





[Left Brachial   





artery]   


 


Blood Pressure   





[Supine]   


 


O2 Saturation 95 96 93














  04/16/18 04/16/18 04/16/18





  07:59 08:00 10:39


 


Temperature  36.5 C 


 


Heart Rate 100  


 


Heart Rate [  90 





Brachial]   


 


Heart Rate [   104 H





Supine]   


 


Respiratory 24 20 





Rate   


 


Blood Pressure  182/84 H 





[Left Brachial   





artery]   


 


Blood Pressure   151/99 H





[Supine]   


 


O2 Saturation  97 














  04/16/18 04/16/18





  13:23 15:28


 


Temperature  37.5 C


 


Heart Rate 94 


 


Heart Rate [  102 H





Brachial]  


 


Heart Rate [  





Supine]  


 


Respiratory 22 20





Rate  


 


Blood Pressure  186/87 H





[Left Brachial  





artery]  


 


Blood Pressure  





[Supine]  


 


O2 Saturation  98








 Oxygen











O2 Source [With Activity]      Room air


 


O2 Source [Without Activity]   Room air


 


O2 Source                      Room air














I&O (Last 24 Hrs): 





 Intake and Output Totals x24h











 04/14/18 04/15/18 04/16/18





 23:59 23:59 23:59


 


Intake Total 250 3253.333 1285.000


 


Output Total  700 450


 


Balance 250 2553.333 835.000











General: Alert, No acute distress


HEENT: Atraumatic


Neck: Supple


Lymphatic: no adenopathy


Neuro: Alert, Disoriented, Focal Deficits, Speech Slurred


Cardiovascular: Regular rate


Respiratory: Chest non-tender, Wheezes, Rhonchi


Abdomen: Normal bowel sounds, Soft, No tenderness, No masses


Extremities: No edema, No tenderness/swelling


Skin: No rashes, No breakdown





- Results


Results: 





 Laboratory Results











WBC  16.1 x10^3/uL (4.8-10.8)  H  04/15/18  05:45    


 


RBC  2.82 10^6/uL (4.20-5.40)  L  04/15/18  05:45    


 


Hgb  7.1 g/dL (12.0-16.0)  L  04/15/18  05:45    


 


Hct  22.8 % (37.0-47.0)  L  04/15/18  05:45    


 


MCV  80.9 fL (81.0-99.0)  L  04/15/18  05:45    


 


MCH  25.1 pg (27.0-31.0)  L  04/15/18  05:45    


 


MCHC  31.0 g/dL (32.0-36.0)  L  04/15/18  05:45    


 


RDW  18.0 % (12.0-15.0)  H  04/15/18  05:45    


 


Plt Count  393 10^3/uL (130-450)   04/15/18  05:45    


 


MPV  7.8 fL (7.9-10.8)  L  04/15/18  05:45    


 


Neut #  Not Reportable   04/15/18  05:45    


 


Lymph #  Not Reportable   04/15/18  05:45    


 


Mono #  Not Reportable   04/15/18  05:45    


 


Eos #  Not Reportable   04/15/18  05:45    


 


Baso #  Not Reportable   04/15/18  05:45    


 


Absolute Nucleated RBC  Not Reportable   04/15/18  05:45    


 


Total Counted  100   04/15/18  05:45    


 


Band Neuts % (Manual)  0 % (0-10)  04/15/18  05:45    


 


Abnorm Lymph % (Manual)  0 %  04/15/18  05:45    


 


Nucleated RBC %  Not Reportable   04/15/18  05:45    


 


Neutrophils # (Manual)  12.2 10^3/uL (1.5-6.6)  H  04/15/18  05:45    


 


Lymphocytes # (Manual)  1.3 10^3/uL (1.5-3.5)  L  04/15/18  05:45    


 


Monocytes # (Manual)  2.6 10^3/uL (0.0-1.0)  H  04/15/18  05:45    


 


Eosinophils # (Manual)  0.0 10^3/uL (0-0.7)   04/15/18  05:45    


 


Basophils # (Manual)  0.0 10^3/uL (0-0.1)   04/15/18  05:45    


 


Differential Comment  MANUAL DIFFERENTIAL   04/15/18  05:45    


 


Manual Slide Review  Indicated   04/14/18  19:30    


 


WBC Morphology  NORMAL APPEARANCE  (NORMAL)   04/14/18  19:30    


 


Platelet Estimate  NORMAL (130-450,000)  (NORMAL)   04/15/18  05:45    


 


Platelet Morphology  NORMAL APPEARANCE  (NORMAL)   04/14/18  19:30    


 


RBC Morph Micro Appear  2+  ANISOCYTOSIS  (NORMAL) 1+ POIKILOCYTOSIS  (NORMAL) 1

+ POLYCHROMASIA  (NORMAL) 2+ HYPOCHROMASIA  (NORMAL) 2+ OVALOCYTES  (NORMAL)   

04/14/18  19:30    


 


RBC Morph Micro Appear  2+  ANISOCYTOSIS  (NORMAL) 1+ POIKILOCYTOSIS  (NORMAL) 1

+ POLYCHROMASIA  (NORMAL) 2+ HYPOCHROMASIA  (NORMAL) 2+ OVALOCYTES  (NORMAL)   

04/14/18  19:30    


 


RBC Morph Micro Appear  2+  ANISOCYTOSIS  (NORMAL) 1+ POIKILOCYTOSIS  (NORMAL) 1

+ POLYCHROMASIA  (NORMAL) 2+ HYPOCHROMASIA  (NORMAL) 2+ OVALOCYTES  (NORMAL)   

04/14/18  19:30    


 


RBC Morph Micro Appear  2+  ANISOCYTOSIS  (NORMAL) 1+ POIKILOCYTOSIS  (NORMAL) 1

+ POLYCHROMASIA  (NORMAL) 2+ HYPOCHROMASIA  (NORMAL) 2+ OVALOCYTES  (NORMAL)   

04/14/18  19:30    


 


RBC Morph Micro Appear  1+ ANISOCYTOSIS  (NORMAL) 1+ MICROCYTOSIS  (NORMAL) 2+ 

HYPOCHROMASIA  (NORMAL) 1+ OVALOCYTES  (NORMAL)   04/15/18  05:45    


 


RBC Morph Micro Appear  1+ ANISOCYTOSIS  (NORMAL) 1+ MICROCYTOSIS  (NORMAL) 2+ 

HYPOCHROMASIA  (NORMAL) 1+ OVALOCYTES  (NORMAL)   04/15/18  05:45    


 


RBC Morph Micro Appear  1+ ANISOCYTOSIS  (NORMAL) 1+ MICROCYTOSIS  (NORMAL) 2+ 

HYPOCHROMASIA  (NORMAL) 1+ OVALOCYTES  (NORMAL)   04/15/18  05:45    


 


RBC Morph Micro Appear  1+ ANISOCYTOSIS  (NORMAL) 1+ MICROCYTOSIS  (NORMAL) 2+ 

HYPOCHROMASIA  (NORMAL) 1+ OVALOCYTES  (NORMAL)   04/15/18  05:45    


 


Sodium  134 mmol/L (135-145)  L  04/15/18  05:45    


 


Potassium  4.2 mmol/L (3.5-5.0)   04/15/18  05:45    


 


Chloride  103 mmol/L (101-111)   04/15/18  05:45    


 


Carbon Dioxide  24 mmol/L (21-32)   04/15/18  05:45    


 


Anion Gap  7.0  (6-13)   04/15/18  05:45    


 


BUN  27 mg/dL (6-20)  H  04/15/18  05:45    


 


Creatinine  1.5 mg/dL (0.4-1.0)  H  04/15/18  05:45    


 


Estimated GFR (MDRD)  33  (>89)  L  04/15/18  05:45    


 


Glucose  110 mg/dL ()  H  04/15/18  05:45    


 


Lactic Acid  0.8 mmol/L (0.5-2.2)   04/14/18  19:30    


 


Calcium  7.9 mg/dL (8.5-10.3)  L  04/15/18  05:45    


 


Total Bilirubin  0.6 mg/dL (0.2-1.0)   04/14/18  19:30    


 


AST  17 IU/L (10-42)   04/14/18  19:30    


 


ALT  15 IU/L (10-60)   04/14/18  19:30    


 


Alkaline Phosphatase  122 IU/L ()  H  04/14/18  19:30    


 


Total Protein  7.4 g/dL (6.7-8.2)   04/14/18  19:30    


 


Albumin  3.1 g/dL (3.2-5.5)  L  04/14/18  19:30    


 


Globulin  4.3 g/dL (2.1-4.2)  H  04/14/18  19:30    


 


Albumin/Globulin Ratio  0.7  (1.0-2.2)  L  04/14/18  19:30    


 


Lipase  36 U/L (22-51)   04/14/18  19:30    


 


Urine Color  YELLOW   04/16/18  05:05    


 


Urine Clarity  CLEAR  (CLEAR)   04/16/18  05:05    


 


Urine pH  5.5 PH (5.0-7.5)   04/16/18  05:05    


 


Ur Specific Gravity  1.020  (1.002-1.030)   04/16/18  05:05    


 


Urine Protein  NEGATIVE mg/dL (NEGATIVE)   04/16/18  05:05    


 


Urine Glucose (UA)  NEGATIVE mg/dL (NEGATIVE)   04/16/18  05:05    


 


Urine Ketones  NEGATIVE mg/dL (NEGATIVE)   04/16/18  05:05    


 


Urine Occult Blood  TRACE-LYSE  (NEGATIVE)   04/16/18  05:05    


 


Urine Nitrite  NEGATIVE  (NEGATIVE)   04/16/18  05:05    


 


Urine Bilirubin  NEGATIVE  (NEGATIVE)   04/16/18  05:05    


 


Urine Urobilinogen  0.2 (NORMAL) E.U./dL (NORMAL)   04/16/18  05:05    


 


Ur Leukocyte Esterase  NEGATIVE  (NEGATIVE)   04/16/18  05:05    


 


Ur Microscopic Review  NOT INDICATED   04/16/18  05:05    


 


Urine Culture Comments  NOT INDICATED   04/16/18  05:05    


 


Influenza A (Rapid)  Negative  (Negative)   04/14/18  20:50    


 


Influenza B (Rapid)  Negative  (Negative)   04/14/18  20:50

## 2018-04-17 LAB
ALBUMIN DIAFP-MCNC: 2.5 G/DL (ref 3.2–5.5)
ALBUMIN/GLOB SERPL: 0.6 {RATIO} (ref 1–2.2)
ALP SERPL-CCNC: 111 IU/L (ref 42–121)
ALT SERPL W P-5'-P-CCNC: 13 IU/L (ref 10–60)
ANION GAP SERPL CALCULATED.4IONS-SCNC: 9 MMOL/L (ref 6–13)
AST SERPL W P-5'-P-CCNC: 20 IU/L (ref 10–42)
BASOPHILS NFR BLD AUTO: 0.1 10^3/UL (ref 0–0.1)
BASOPHILS NFR BLD AUTO: 0.4 %
BILIRUB BLD-MCNC: 0.3 MG/DL (ref 0.2–1)
BUN SERPL-MCNC: 13 MG/DL (ref 6–20)
CALCIUM UR-MCNC: 8.2 MG/DL (ref 8.5–10.3)
CHLORIDE SERPL-SCNC: 107 MMOL/L (ref 101–111)
CO2 SERPL-SCNC: 22 MMOL/L (ref 21–32)
CREAT SERPLBLD-SCNC: 1.1 MG/DL (ref 0.4–1)
EOSINOPHIL # BLD AUTO: 0 10^3/UL (ref 0–0.7)
EOSINOPHIL NFR BLD AUTO: 0 %
ERYTHROCYTE [DISTWIDTH] IN BLOOD BY AUTOMATED COUNT: 17.9 % (ref 12–15)
FERRITIN SERPL-MCNC: 55.5 NG/ML (ref 11–306.8)
GFRSERPLBLD MDRD-ARVRAT: 47 ML/MIN/{1.73_M2} (ref 89–?)
GLOBULIN SER-MCNC: 4.1 G/DL (ref 2.1–4.2)
GLUCOSE SERPL-MCNC: 127 MG/DL (ref 70–100)
HGB UR QL STRIP: 7.2 G/DL (ref 12–16)
IRON SATN MFR SERPL: 3 % (ref 20–50)
IRON SERPL-MCNC: 8 UG/DL (ref 28–170)
LYMPHOCYTES # SPEC AUTO: 0.6 10^3/UL (ref 1.5–3.5)
LYMPHOCYTES NFR BLD AUTO: 3.6 %
MAGNESIUM SERPL-MCNC: 1.7 MG/DL (ref 1.7–2.8)
MCH RBC QN AUTO: 25.7 PG (ref 27–31)
MCHC RBC AUTO-ENTMCNC: 31.8 G/DL (ref 32–36)
MCV RBC AUTO: 80.8 FL (ref 81–99)
MEAN RETIC VALUE: 101.4
MONOCYTES # BLD AUTO: 2.2 10^3/UL (ref 0–1)
MONOCYTES NFR BLD AUTO: 12 %
NEUTROPHILS # BLD AUTO: 15.1 10^3/UL (ref 1.5–6.6)
NEUTROPHILS # SNV AUTO: 18 X10^3/UL (ref 4.8–10.8)
NEUTROPHILS NFR BLD AUTO: 84 %
PDW BLD AUTO: 7.8 FL (ref 7.9–10.8)
PLATELET # BLD: 383 10^3/UL (ref 130–450)
PROT SPEC-MCNC: 6.6 G/DL (ref 6.7–8.2)
RBC MAR: 2.8 10^6/UL (ref 4.2–5.4)
RBC MAR: 2.85 10^6/UL (ref 4.2–5.4)
SODIUM SERPLBLD-SCNC: 138 MMOL/L (ref 135–145)
TIBC SERPL-MCNC: 242 UG/DL (ref 250–450)
TRANSFERRIN SERPL-MCNC: 173 MG/DL (ref 192–382)
VIT B12 SERPL-MCNC: 1422 PG/ML (ref 180–914)

## 2018-04-17 PROCEDURE — 30233N1 TRANSFUSION OF NONAUTOLOGOUS RED BLOOD CELLS INTO PERIPHERAL VEIN, PERCUTANEOUS APPROACH: ICD-10-PCS | Performed by: NURSE PRACTITIONER

## 2018-04-17 RX ADMIN — FAMOTIDINE SCH MG: 20 TABLET, FILM COATED ORAL at 08:48

## 2018-04-17 RX ADMIN — POLYETHYLENE GLYCOL 3350 SCH GM: 17 POWDER, FOR SOLUTION ORAL at 08:48

## 2018-04-17 RX ADMIN — SODIUM CHLORIDE SCH MLS/HR: 9 INJECTION, SOLUTION INTRAVENOUS at 06:13

## 2018-04-17 RX ADMIN — SODIUM CHLORIDE, PRESERVATIVE FREE PRN ML: 5 INJECTION INTRAVENOUS at 22:29

## 2018-04-17 RX ADMIN — ATORVASTATIN CALCIUM SCH MG: 10 TABLET, FILM COATED ORAL at 20:25

## 2018-04-17 RX ADMIN — FERROUS SULFATE TAB 325 MG (65 MG ELEMENTAL FE) SCH MG: 325 (65 FE) TAB at 08:48

## 2018-04-17 RX ADMIN — DEXTROSE AND SODIUM CHLORIDE SCH MLS/HR: 5; 900 INJECTION, SOLUTION INTRAVENOUS at 07:41

## 2018-04-17 RX ADMIN — SODIUM CHLORIDE SCH MLS/HR: 9 INJECTION, SOLUTION INTRAVENOUS at 11:43

## 2018-04-17 RX ADMIN — SODIUM CHLORIDE SCH MLS/HR: 900 INJECTION INTRAVENOUS at 21:02

## 2018-04-17 RX ADMIN — SODIUM CHLORIDE SCH MLS/HR: 9 INJECTION, SOLUTION INTRAVENOUS at 00:16

## 2018-04-17 RX ADMIN — SODIUM CHLORIDE, PRESERVATIVE FREE SCH: 5 INJECTION INTRAVENOUS at 00:18

## 2018-04-17 RX ADMIN — ENOXAPARIN SODIUM SCH MG: 100 INJECTION SUBCUTANEOUS at 20:03

## 2018-04-17 RX ADMIN — FERROUS SULFATE TAB 325 MG (65 MG ELEMENTAL FE) SCH MG: 325 (65 FE) TAB at 16:19

## 2018-04-17 RX ADMIN — SODIUM CHLORIDE, PRESERVATIVE FREE SCH ML: 5 INJECTION INTRAVENOUS at 08:49

## 2018-04-17 RX ADMIN — LEVOTHYROXINE SODIUM SCH MCG: 75 TABLET ORAL at 07:41

## 2018-04-17 RX ADMIN — SODIUM CHLORIDE, PRESERVATIVE FREE SCH: 5 INJECTION INTRAVENOUS at 16:09

## 2018-04-17 NOTE — PROVIDER PROGRESS NOTE
Subjective





- Prog Note Date


Prog Note Date: 04/17/18





- Subjective


Pt reports feeling: Worse


Subjective: 


pt state she feel not good today, pt complaints some SOB, but denies chest pain

, denies fever, chill. 








Current Medications





- Current Medications


Current Medications: 





Active Medications





Acetaminophen (Tylenol)  650 mg PO Q4HR PRN


   PRN Reason: Pain or Fever > 38C (100.4F)


Albuterol/Ipratropium (Duoneb)  3 ml INH RTTID PRN


   PRN Reason: Bronchospasm


Atorvastatin Calcium (Lipitor)  10 mg PO QPM [facility]


   Last Admin: 04/16/18 21:05 Dose:  10 mg


Famotidine (Pepcid)  20 mg PO DAILY [facility]


   Last Admin: 04/17/18 08:48 Dose:  20 mg


Ferrous Sulfate (Feosol)  325 mg PO BIDWM [facility]


   Last Admin: 04/17/18 08:48 Dose:  325 mg


Guaifenesin/Codeine Phosphate (Robitussin Ac)  5 ml PO Q6H [facility]


   Last Admin: 04/17/18 10:04 Dose:  5 ml


Cefepime HCl 1 gm/ Sodium (Chloride)  100 mls @ 200 mls/hr IV Q8H [facility]


   Last Admin: 04/17/18 10:26 Dose:  Not Given


Sodium Chloride (Normal Saline 0.9%)  1,000 mls @ 75 mls/hr IV .[id number] [facility]


   Last Admin: 04/17/18 11:43 Dose:  75 mls/hr


Levothyroxine Sodium (Synthroid)  75 mcg PO QDAC [facility]


Losartan Potassium (Cozaar)  50 mg PO DAILY [facility]


   Last Admin: 04/17/18 08:48 Dose:  50 mg


Mineral Oil (Cavilon)  1 applic TOP PRN PRN


   PRN Reason: Skin Care


Ondansetron HCl (Zofran Inj)  4 mg IVP Q6HR PRN


   PRN Reason: Nausea / Vomiting


   Last Admin: 04/16/18 11:03 Dose:  4 mg


Polyethylene Glycol (Miralax)  17 gm PO DAILY [facility]


   Last Admin: 04/17/18 08:48 Dose:  17 gm


Quetiapine Fumarate (Seroquel)  25 mg PO QPM PRN


   PRN Reason: Agitation


Sodium Chloride (Normal Saline Flush 0.9%)  10 ml IVP PRN PRN


   PRN Reason: AS NEEDED PER PROVIDER ORDERS


   Last Admin: 04/15/18 05:26 Dose:  10 ml


Sodium Chloride (Normal Saline Flush 0.9%)  10 ml IVP 0100,0900,1700 ALEXEY


   Last Admin: 04/17/18 08:49 Dose:  10 ml





 





Simvastatin 20 mg ORAL QPM 12/25/16 


Valsartan 40 mg ORAL Q2D 12/25/16 


Levothyroxine Sodium [Levothyroxine Sodium] 125 mcg PO QDAC 04/15/18 











Objective





- Vital Signs/Intake & Output


Reviewed Vital Signs: Yes


Vital Signs: 


 Vital Signs x48h











  Temp Pulse Resp BP Pulse Ox


 


 04/17/18 07:34  36.8 C  99  20  169/83 H  97











Intake & Output: 


 Intake & Output











 04/14/18 04/15/18 04/16/18 04/17/18





 23:59 23:59 23:59 23:59


 


Intake Total 250 3253.333 2735.000 1795.834


 


Output Total  700 1450 325


 


Balance 250 2553.333 2828.566 2867.834














- Objective


General Appearance: positive: No acute distress, Alert.  negative: Lethargic


Eyes Bilateral: positive: Normal inspection, PERRL, No lid inflammation, 

Conjunctivae nml


ENT: positive: ENT inspection nml, Pharynx nml, No signs of dehydration.  

negative: Purulent nasal drainage, Pharyngeal erythema, Oral lesions


Neck: positive: Nml inspection, Thyroid nml, No JVD, Trachea midline.  negative

: Thyromegaly, Lymphadenopathy (R), Lymphadenopathy (L), Stiff neck, Carotid 

bruit, Swelling/bruising, Tracheal deviation


Respiratory: positive: Chest non-tender, No respiratory distress, Wheezes, Rales

, Other (right side some crackers)


Cardiovascular: positive: Regular rate & rhythm, No murmur, No gallop.  negative

: Irregularly irregular, Extrasystoles, Tachycardia, Bradycardia, Systolic 

murmur, Diastolic murmur


Peripheral Pulses: 2+ Radial (R), 2+ Radial (L), 2+ Dorsalis pedis (R), 2+ 

Dorsalis pedis (L)


Abdomen: positive: Non-tender, No organomegaly, Nml bowel sounds, No 

distention.  negative: Tenderness, Guarding, Rebound


Back: positive: Nml inspection.  negative: CVA tenderness (R), CVA tenderness (L

)


Skin: positive: Color nml, No rash, Warm, Dry.  negative: Cyanosis, Diaphoresis

, Pallor


Extremities: positive: Non-tender, Nml appearance.  negative: Calf tenderness, 

Joint swelling, Myesha's sign/cords


Neurologic/Psychiatric: positive: Sensation nml.  negative: Sensory loss, 

Facial droop, Slurred/abnml speech, Depressed mood/affect





- Lab Results


Fish Bones: 


 04/17/18 08:37





 04/17/18 08:37


Other Labs: 


 Lab Results x24hrs











  04/17/18 04/17/18 04/17/18 Range/Units





  08:37 08:37 08:37 


 


WBC     (4.8-10.8)  x10^3/uL


 


RBC     (4.20-5.40)  10^6/uL


 


Hgb     (12.0-16.0)  g/dL


 


Hct     (37.0-47.0)  %


 


MCV     (81.0-99.0)  fL


 


MCH     (27.0-31.0)  pg


 


MCHC     (32.0-36.0)  g/dL


 


RDW     (12.0-15.0)  %


 


Plt Count     (130-450)  10^3/uL


 


MPV     (7.9-10.8)  fL


 


Reticulocyte % (Auto)     (0.5-2.3)  %


 


Neut #     (1.5-6.6)  10^3/uL


 


Lymph #     (1.5-3.5)  10^3/uL


 


Mono #     (0.0-1.0)  10^3/uL


 


Eos #     (0.0-0.7)  10^3/uL


 


Baso #     (0.0-0.1)  10^3/uL


 


Absolute Nucleated RBC     x10^3/uL


 


Nucleated RBC %     /100WBC


 


Absolute Retic     (0.020-0.110)  10^6/uL


 


Sodium     (135-145)  mmol/L


 


Potassium     (3.5-5.0)  mmol/L


 


Chloride     (101-111)  mmol/L


 


Carbon Dioxide     (21-32)  mmol/L


 


Anion Gap     (6-13)  


 


BUN     (6-20)  mg/dL


 


Creatinine     (0.4-1.0)  mg/dL


 


Estimated GFR (MDRD)     (>89)  


 


Glucose     ()  mg/dL


 


Calcium     (8.5-10.3)  mg/dL


 


Magnesium     (1.7-2.8)  mg/dL


 


Iron     ()  ug/dL


 


TIBC     (250-450)  ug/dL


 


% Saturation     (20-50)  %


 


Transferrin     (192-382)  mg/dL


 


Ferritin    55.5  (11.0-306.8)  ng/mL


 


Total Bilirubin     (0.2-1.0)  mg/dL


 


AST     (10-42)  IU/L


 


ALT     (10-60)  IU/L


 


Alkaline Phosphatase     ()  IU/L


 


Lactate Dehydrogenase   147   ()  IU/L


 


Total Protein     (6.7-8.2)  g/dL


 


Albumin     (3.2-5.5)  g/dL


 


Globulin     (2.1-4.2)  g/dL


 


Albumin/Globulin Ratio     (1.0-2.2)  


 


Vitamin B12    1422 H  (180-914)  pg/mL


 


TSH  0.08 L    (0.34-5.60)  uIU/mL














  04/17/18 04/17/18 04/17/18 Range/Units





  08:37 08:37 08:37 


 


WBC     (4.8-10.8)  x10^3/uL


 


RBC   2.85 L   (4.20-5.40)  10^6/uL


 


Hgb     (12.0-16.0)  g/dL


 


Hct     (37.0-47.0)  %


 


MCV     (81.0-99.0)  fL


 


MCH     (27.0-31.0)  pg


 


MCHC     (32.0-36.0)  g/dL


 


RDW     (12.0-15.0)  %


 


Plt Count     (130-450)  10^3/uL


 


MPV     (7.9-10.8)  fL


 


Reticulocyte % (Auto)   1.43   (0.5-2.3)  %


 


Neut #     (1.5-6.6)  10^3/uL


 


Lymph #     (1.5-3.5)  10^3/uL


 


Mono #     (0.0-1.0)  10^3/uL


 


Eos #     (0.0-0.7)  10^3/uL


 


Baso #     (0.0-0.1)  10^3/uL


 


Absolute Nucleated RBC     x10^3/uL


 


Nucleated RBC %     /100WBC


 


Absolute Retic   0.041   (0.020-0.110)  10^6/uL


 


Sodium    138  (135-145)  mmol/L


 


Potassium    3.7  (3.5-5.0)  mmol/L


 


Chloride    107  (101-111)  mmol/L


 


Carbon Dioxide    22  (21-32)  mmol/L


 


Anion Gap    9.0  (6-13)  


 


BUN    13  (6-20)  mg/dL


 


Creatinine    1.1 H  (0.4-1.0)  mg/dL


 


Estimated GFR (MDRD)    47 L  (>89)  


 


Glucose    127 H  ()  mg/dL


 


Calcium    8.2 L  (8.5-10.3)  mg/dL


 


Magnesium    1.7  (1.7-2.8)  mg/dL


 


Iron  8 L    ()  ug/dL


 


TIBC  242 L    (250-450)  ug/dL


 


% Saturation  3 L    (20-50)  %


 


Transferrin  173 L    (192-382)  mg/dL


 


Ferritin     (11.0-306.8)  ng/mL


 


Total Bilirubin    0.3  (0.2-1.0)  mg/dL


 


AST    20  (10-42)  IU/L


 


ALT    13  (10-60)  IU/L


 


Alkaline Phosphatase    111  ()  IU/L


 


Lactate Dehydrogenase     ()  IU/L


 


Total Protein    6.6 L  (6.7-8.2)  g/dL


 


Albumin    2.5 L  (3.2-5.5)  g/dL


 


Globulin    4.1  (2.1-4.2)  g/dL


 


Albumin/Globulin Ratio    0.6 L  (1.0-2.2)  


 


Vitamin B12     (180-914)  pg/mL


 


TSH     (0.34-5.60)  uIU/mL














  04/17/18 Range/Units





  08:37 


 


WBC  18.0 H  (4.8-10.8)  x10^3/uL


 


RBC  2.80 L  (4.20-5.40)  10^6/uL


 


Hgb  7.2 L  (12.0-16.0)  g/dL


 


Hct  22.6 L  (37.0-47.0)  %


 


MCV  80.8 L  (81.0-99.0)  fL


 


MCH  25.7 L  (27.0-31.0)  pg


 


MCHC  31.8 L  (32.0-36.0)  g/dL


 


RDW  17.9 H  (12.0-15.0)  %


 


Plt Count  383  (130-450)  10^3/uL


 


MPV  7.8 L  (7.9-10.8)  fL


 


Reticulocyte % (Auto)   (0.5-2.3)  %


 


Neut #  15.1 H  (1.5-6.6)  10^3/uL


 


Lymph #  0.6 L  (1.5-3.5)  10^3/uL


 


Mono #  2.2 H  (0.0-1.0)  10^3/uL


 


Eos #  0.0  (0.0-0.7)  10^3/uL


 


Baso #  0.1  (0.0-0.1)  10^3/uL


 


Absolute Nucleated RBC  0.00  x10^3/uL


 


Nucleated RBC %  0.0  /100WBC


 


Absolute Retic   (0.020-0.110)  10^6/uL


 


Sodium   (135-145)  mmol/L


 


Potassium   (3.5-5.0)  mmol/L


 


Chloride   (101-111)  mmol/L


 


Carbon Dioxide   (21-32)  mmol/L


 


Anion Gap   (6-13)  


 


BUN   (6-20)  mg/dL


 


Creatinine   (0.4-1.0)  mg/dL


 


Estimated GFR (MDRD)   (>89)  


 


Glucose   ()  mg/dL


 


Calcium   (8.5-10.3)  mg/dL


 


Magnesium   (1.7-2.8)  mg/dL


 


Iron   ()  ug/dL


 


TIBC   (250-450)  ug/dL


 


% Saturation   (20-50)  %


 


Transferrin   (192-382)  mg/dL


 


Ferritin   (11.0-306.8)  ng/mL


 


Total Bilirubin   (0.2-1.0)  mg/dL


 


AST   (10-42)  IU/L


 


ALT   (10-60)  IU/L


 


Alkaline Phosphatase   ()  IU/L


 


Lactate Dehydrogenase   ()  IU/L


 


Total Protein   (6.7-8.2)  g/dL


 


Albumin   (3.2-5.5)  g/dL


 


Globulin   (2.1-4.2)  g/dL


 


Albumin/Globulin Ratio   (1.0-2.2)  


 


Vitamin B12   (180-914)  pg/mL


 


TSH   (0.34-5.60)  uIU/mL














Assessment/Plan





- Problem List


(1) Pneumonia


Impression: 


lung cracker sound indicate pt still has lung infection. Pt present some SOB, 

and elevated WBC


change antibiotic to Cefepime


CXR, follow up


follow up sputum culture


preliminary blood culture negative


continue daily lab, vital monitor





Qualifiers: 


   Pneumonia type: due to unspecified organism   Laterality: right   Lung 

location: middle lobe of lung   Qualified Code(s): J18.1 - Lobar pneumonia, 

unspecified organism   





(2) Anemia


Impression: 


today pt's HGB 7.2, and pt present some SOB, also pt present some pallor. 

transfusion of one unit of blood with cross match/typing.


it seems pt has iron deficiency from lower MCV.


order iron bid


order iron study


nurse report pt did not have GI bleeding, it is normal color of stool





Qualifiers: 


   Anemia type: iron deficiency   Iron deficiency anemia type: unspecified iron 

deficiency   Qualified Code(s): D50.9 - Iron deficiency anemia, unspecified   





(3) Shortness of breath


Impression: 


pt state she feel not good today, pt present some SOB. Pt's vital seems not 

indicating of PE, pt did not present distress for PE, will close monitor


order CXR


continue breathing treatment


order D-dimer


change antibiotics to Cefepime


continue vital monitor closely








(4) Essential hypertension


Impression: 


slightly elevated BP, resume home meds Losartan


vital monitor








(5) Hypothyroidism


Impression: 


check TSH, follow up








(6) CKD (chronic kidney disease) stage 3, GFR 30-59 ml/min


Impression: 


chronic, keep hydration, and precaution of fluid loaded, hold nephrological 

toxical agents


IVF 75 cc/h


daily

## 2018-04-17 NOTE — CT PRELIMINARY REPORT
Accession: [id number]

Exam: CT CHEST ANGIO (PE)

 

IMPRESSION: 

1. No pulmonary emboli seen. 

2. Borderline heart size with coronary artery calcifications and possible emphysema. 

3. Bibasilar atelectasis or infiltrate and small pleural effusions. 

4. Moderate hiatal hernia.

 

RADIA

 

SITE ID: [id number]

## 2018-04-17 NOTE — XRAY REPORT
EXAM: 

CHEST RADIOGRAPHY

 

EXAM DATE: 4/17/2018 09:48 AM.

 

CLINICAL HISTORY: Shortness of breath.

 

COMPARISON: 04/14/2018.

 

TECHNIQUE: 1 view.

 

FINDINGS:

Lungs/Pleura: The patient is rotated. Similar appearance of the right middle lobe opacity allowing fo
r differences in technique. No evidence of pneumothorax or large pleural effusion.

 

Mediastinum: Within exam limitations, the cardiomediastinal contour is normal.

 

Other: None.

 

IMPRESSION: No significant change from prior.

 

[facility]

Referring Provider Line: [phone]

 

SITE ID: [id number]

## 2018-04-17 NOTE — XRAY PRELIMINARY REPORT
Accession: [id number]

Exam: XR CHEST 1 VIEW X-RAY

 

IMPRESSION: No significant change from prior.

 

[facility]

 

SITE ID: [id number]

## 2018-04-17 NOTE — CT REPORT
EXAM:

CT ANGIOGRAM CHEST

 

EXAM DATE: 4/17/2018 10:22 PM.

 

CLINICAL HISTORY: Shortness of breath. Elevated D-dimer.

 

COMPARISON: None.

 

TECHNIQUE: Routine helical imaging was performed through the chest in the pulmonary arterial phase. I
V Contrast: Nonionic. Reconstructions: Coronal 3-D MIP reconstructions.Sagittal and coronal.

 

In accordance with CT protocol optimization, one or more of the following dose reduction techniques w
ere utilized for this exam: automated exposure control, adjustment of mA and/or KV based on patient s
ize, or use of iterative reconstructive technique.

 

FINDINGS: 

Pulmonary Arteries: 

Diagnostic quality: Adequate through the segmental arteries. No evidence for acute or chronic pulmona
ry emboli. 

 

No evidence of right heart strain.

 

Lungs/Pleura: Bibasilar atelectasis or infiltrate. Small pleural effusions. No pneumothorax. Possible
 emphysema. Some images are degraded due to motion artifact.

 

Mediastinum: Heart size upper normal. Coronary artery calcifications. Moderate hiatal hernia. No lymp
hadenopathy. 

 

Thoracic Aorta: Mild atherosclerosis. No aortic aneurysm or dissection.

 

Upper Abdomen: Possible fatty liver. Bilateral renal cysts.

 

Other: Osteopenia. Degenerative joint disease in the shoulders. Mild scoliosis.

 

IMPRESSION: 

1. No pulmonary emboli seen. 

2. Borderline heart size with coronary artery calcifications and possible emphysema. 

3. Bibasilar atelectasis or infiltrate and small pleural effusions. 

4. Moderate hiatal hernia.

 

[facility]

Referring Provider Line: [phone]

 

SITE ID: [id number]

## 2018-04-18 LAB
ALBUMIN DIAFP-MCNC: 2.4 G/DL (ref 3.2–5.5)
ALBUMIN/GLOB SERPL: 0.6 {RATIO} (ref 1–2.2)
ALP SERPL-CCNC: 111 IU/L (ref 42–121)
ALT SERPL W P-5'-P-CCNC: 14 IU/L (ref 10–60)
ANION GAP SERPL CALCULATED.4IONS-SCNC: 7 MMOL/L (ref 6–13)
AST SERPL W P-5'-P-CCNC: 17 IU/L (ref 10–42)
BASOPHILS NFR BLD AUTO: 0 10^3/UL (ref 0–0.1)
BASOPHILS NFR BLD AUTO: 0.4 %
BILIRUB BLD-MCNC: 0.4 MG/DL (ref 0.2–1)
BUN SERPL-MCNC: 16 MG/DL (ref 6–20)
CALCIUM UR-MCNC: 8.1 MG/DL (ref 8.5–10.3)
CHLORIDE SERPL-SCNC: 107 MMOL/L (ref 101–111)
CO2 SERPL-SCNC: 22 MMOL/L (ref 21–32)
CREAT SERPLBLD-SCNC: 1.1 MG/DL (ref 0.4–1)
EOSINOPHIL # BLD AUTO: 0 10^3/UL (ref 0–0.7)
EOSINOPHIL NFR BLD AUTO: 0.1 %
ERYTHROCYTE [DISTWIDTH] IN BLOOD BY AUTOMATED COUNT: 17.6 % (ref 12–15)
GFRSERPLBLD MDRD-ARVRAT: 47 ML/MIN/{1.73_M2} (ref 89–?)
GLOBULIN SER-MCNC: 4.2 G/DL (ref 2.1–4.2)
GLUCOSE SERPL-MCNC: 94 MG/DL (ref 70–100)
HGB UR QL STRIP: 8.1 G/DL (ref 12–16)
LYMPHOCYTES # SPEC AUTO: 0.9 10^3/UL (ref 1.5–3.5)
LYMPHOCYTES NFR BLD AUTO: 7 %
MCH RBC QN AUTO: 25.2 PG (ref 27–31)
MCHC RBC AUTO-ENTMCNC: 31.1 G/DL (ref 32–36)
MCV RBC AUTO: 81 FL (ref 81–99)
MONOCYTES # BLD AUTO: 1 10^3/UL (ref 0–1)
MONOCYTES NFR BLD AUTO: 8.5 %
NEUTROPHILS # BLD AUTO: 10.4 10^3/UL (ref 1.5–6.6)
NEUTROPHILS # SNV AUTO: 12.3 X10^3/UL (ref 4.8–10.8)
NEUTROPHILS NFR BLD AUTO: 84 %
PDW BLD AUTO: 7.6 FL (ref 7.9–10.8)
PLATELET # BLD: 371 10^3/UL (ref 130–450)
PROT SPEC-MCNC: 6.6 G/DL (ref 6.7–8.2)
RBC MAR: 3.22 10^6/UL (ref 4.2–5.4)
SODIUM SERPLBLD-SCNC: 136 MMOL/L (ref 135–145)

## 2018-04-18 RX ADMIN — SODIUM CHLORIDE SCH: 9 INJECTION, SOLUTION INTRAVENOUS at 01:04

## 2018-04-18 RX ADMIN — SODIUM CHLORIDE, PRESERVATIVE FREE SCH ML: 5 INJECTION INTRAVENOUS at 16:42

## 2018-04-18 RX ADMIN — SODIUM CHLORIDE SCH MLS/HR: 900 INJECTION INTRAVENOUS at 21:43

## 2018-04-18 RX ADMIN — FAMOTIDINE SCH MG: 20 TABLET, FILM COATED ORAL at 08:28

## 2018-04-18 RX ADMIN — SODIUM CHLORIDE SCH MLS/HR: 900 INJECTION INTRAVENOUS at 13:38

## 2018-04-18 RX ADMIN — LEVOTHYROXINE SODIUM SCH MCG: 75 TABLET ORAL at 06:07

## 2018-04-18 RX ADMIN — SODIUM CHLORIDE, PRESERVATIVE FREE SCH ML: 5 INJECTION INTRAVENOUS at 08:29

## 2018-04-18 RX ADMIN — SODIUM CHLORIDE SCH MLS/HR: 9 INJECTION, SOLUTION INTRAVENOUS at 04:02

## 2018-04-18 RX ADMIN — ENOXAPARIN SODIUM SCH MG: 100 INJECTION SUBCUTANEOUS at 08:28

## 2018-04-18 RX ADMIN — SODIUM CHLORIDE, PRESERVATIVE FREE SCH ML: 5 INJECTION INTRAVENOUS at 01:04

## 2018-04-18 RX ADMIN — LOSARTAN POTASSIUM SCH MG: 50 TABLET, FILM COATED ORAL at 08:28

## 2018-04-18 RX ADMIN — SODIUM CHLORIDE SCH MLS/HR: 900 INJECTION INTRAVENOUS at 04:00

## 2018-04-18 RX ADMIN — POLYETHYLENE GLYCOL 3350 SCH: 17 POWDER, FOR SOLUTION ORAL at 08:29

## 2018-04-18 RX ADMIN — SODIUM CHLORIDE, PRESERVATIVE FREE SCH: 5 INJECTION INTRAVENOUS at 23:48

## 2018-04-18 RX ADMIN — ATORVASTATIN CALCIUM SCH MG: 10 TABLET, FILM COATED ORAL at 20:00

## 2018-04-18 NOTE — PROVIDER PROGRESS NOTE
Subjective





- Prog Note Date


Prog Note Date: 04/18/18





- Subjective


Pt reports feeling: Improved


Subjective: 


pt's mental status as her baseline, but state she feel better than yesterday. 

no fever, chill. Cough is significant reduced. No chest pain, SOB








Current Medications





- Current Medications


Current Medications: 





Active Medications





Acetaminophen (Tylenol)  650 mg PO Q4HR PRN


   PRN Reason: Pain or Fever > 38C (100.4F)


Albuterol/Ipratropium (Duoneb)  3 ml INH RTTID PRN


   PRN Reason: Bronchospasm


Atorvastatin Calcium (Lipitor)  10 mg PO QPM [facility]


   Last Admin: 04/17/18 20:25 Dose:  10 mg


Clonidine HCl (Catapres)  0.1 mg PO BID PRN


   PRN Reason: Hypertensive Emergency


   Last Admin: 04/18/18 08:56 Dose:  0.1 mg


Enoxaparin Sodium (Lovenox)  40 mg SUBQ DAILY [facility]


   Last Admin: 04/18/18 08:28 Dose:  40 mg


Famotidine (Pepcid)  20 mg PO DAILY [facility]


   Last Admin: 04/18/18 08:28 Dose:  20 mg


Guaifenesin/Codeine Phosphate (Robitussin Ac)  5 ml PO Q6H [facility]


   Last Admin: 04/18/18 16:42 Dose:  5 ml


Sodium Chloride (Normal Saline 0.9%)  1,000 mls @ 75 mls/hr IV .[id number] [facility]


   Last Admin: 04/18/18 04:02 Dose:  75 mls/hr


Cefepime HCl 1 gm/ Sodium (Chloride)  100 mls @ 200 mls/hr IV Q8H [facility]


   Last Infusion: 04/18/18 14:34 Dose:  Infused


Levothyroxine Sodium (Synthroid)  75 mcg PO QDAC [facility]


   Last Admin: 04/18/18 06:07 Dose:  75 mcg


Losartan Potassium (Cozaar)  100 mg PO DAILY [facility]


   Last Admin: 04/18/18 08:28 Dose:  100 mg


Mineral Oil (Cavilon)  1 applic TOP PRN PRN


   PRN Reason: Skin Care


Ondansetron HCl (Zofran Inj)  4 mg IVP Q6HR PRN


   PRN Reason: Nausea / Vomiting


   Last Admin: 04/16/18 11:03 Dose:  4 mg


Polyethylene Glycol (Miralax)  17 gm PO DAILY [facility]


   Last Admin: 04/18/18 08:29 Dose:  Not Given


Quetiapine Fumarate (Seroquel)  25 mg PO QPM PRN


   PRN Reason: Agitation


Sodium Chloride (Normal Saline Flush 0.9%)  10 ml IVP PRN PRN


   PRN Reason: AS NEEDED PER PROVIDER ORDERS


   Last Admin: 04/17/18 22:29 Dose:  10 ml


Sodium Chloride (Normal Saline Flush 0.9%)  10 ml IVP 0100,0900,1700 [facility]


   Last Admin: 04/18/18 16:42 Dose:  10 ml





 





Simvastatin 20 mg ORAL QPM 12/25/16 


Valsartan 40 mg ORAL Q2D 12/25/16 


Levothyroxine Sodium [Levothyroxine Sodium] 125 mcg PO QDAC 04/15/18 











Objective





- Vital Signs/Intake & Output


Vital Signs: 


 Vital Signs x48h











  Temp Pulse Resp BP Pulse Ox


 


 04/18/18 15:17  36.1 C L  84  24  179/76 H  98











Intake & Output: 


 Intake & Output











 04/15/18 04/16/18 04/17/18 04/18/18





 23:59 23:59 23:59 23:59


 


Intake Total 3253.333 2735.000 2798.334 1627.5


 


Output Total 700 1450 950 700


 


Balance 2553.333 8583.000 1689.334 927.5














- Objective


General Appearance: positive: No acute distress, Alert.  negative: Lethargic


Eyes Bilateral: positive: Normal inspection, PERRL, No lid inflammation, 

Conjunctivae nml


ENT: positive: ENT inspection nml, Pharynx nml, No signs of dehydration.  

negative: Purulent nasal drainage, Pharyngeal erythema, Oral lesions


Neck: positive: Nml inspection, Thyroid nml, No JVD, Trachea midline.  negative

: Thyromegaly, Lymphadenopathy (R), Lymphadenopathy (L), Stiff neck, Carotid 

bruit, Swelling/bruising, Tracheal deviation


Respiratory: positive: Chest non-tender, No respiratory distress, Breath sounds 

nml.  negative: Wheezes, Rales, Rhonchi


Cardiovascular: positive: Regular rate & rhythm, No murmur, No gallop.  negative

: Irregularly irregular, Extrasystoles, Tachycardia, Systolic murmur, Diastolic 

murmur


Peripheral Pulses: 2+ Radial (R), 2+ Radial (L), 2+ Dorsalis pedis (R), 2+ 

Dorsalis pedis (L)


Abdomen: positive: Non-tender, No organomegaly, Nml bowel sounds, No 

distention.  negative: Tenderness, Guarding, Rebound


Back: positive: Nml inspection.  negative: CVA tenderness (R), CVA tenderness (L

)


Skin: positive: Color nml, No rash, Warm, Dry.  negative: Cyanosis, Diaphoresis

, Pallor


Extremities: positive: Non-tender, Full ROM, Nml appearance.  negative: Calf 

tenderness, Joint swelling, Myesha's sign/cords


Neurologic/Psychiatric: positive: Sensation nml.  negative: Sensory loss, 

Facial droop, Slurred/abnml speech, Depressed mood/affect





- Lab Results


Fish Bones: 


 04/18/18 05:24





 04/18/18 05:24


Other Labs: 


 Lab Results x24hrs











  04/18/18 04/18/18 04/18/18 Range/Units





  05:24 05:24 05:24 


 


WBC     (4.8-10.8)  x10^3/uL


 


RBC     (4.20-5.40)  10^6/uL


 


Hgb     (12.0-16.0)  g/dL


 


Hct     (37.0-47.0)  %


 


MCV     (81.0-99.0)  fL


 


MCH     (27.0-31.0)  pg


 


MCHC     (32.0-36.0)  g/dL


 


RDW     (12.0-15.0)  %


 


Plt Count     (130-450)  10^3/uL


 


MPV     (7.9-10.8)  fL


 


Neut #     (1.5-6.6)  10^3/uL


 


Lymph #     (1.5-3.5)  10^3/uL


 


Mono #     (0.0-1.0)  10^3/uL


 


Eos #     (0.0-0.7)  10^3/uL


 


Baso #     (0.0-0.1)  10^3/uL


 


Absolute Nucleated RBC     x10^3/uL


 


Nucleated RBC %     /100WBC


 


Sodium    136  (135-145)  mmol/L


 


Potassium    3.7  (3.5-5.0)  mmol/L


 


Chloride    107  (101-111)  mmol/L


 


Carbon Dioxide    22  (21-32)  mmol/L


 


Anion Gap    7.0  (6-13)  


 


BUN    16  (6-20)  mg/dL


 


Creatinine    1.1 H  (0.4-1.0)  mg/dL


 


Estimated GFR (MDRD)    47 L  (>89)  


 


Glucose    94  ()  mg/dL


 


Calcium    8.1 L  (8.5-10.3)  mg/dL


 


Total Bilirubin    0.4  (0.2-1.0)  mg/dL


 


AST    17  (10-42)  IU/L


 


ALT    14  (10-60)  IU/L


 


Alkaline Phosphatase    111  ()  IU/L


 


Total Protein    6.6 L  (6.7-8.2)  g/dL


 


Albumin    2.4 L  (3.2-5.5)  g/dL


 


Globulin    4.2  (2.1-4.2)  g/dL


 


Albumin/Globulin Ratio    0.6 L  (1.0-2.2)  


 


Free T4   1.90 H   (0.58-1.64)  ng/dL


 


Free T3 pg/mL  2.16 L    (2.5-3.9)  pg/mL


 


Blood Type     


 


Antibody Screen     


 


Antibody Identification     


 


Crossmatch     














  04/18/18 04/17/18 Range/Units





  05:24 13:35 


 


WBC  12.3 H   (4.8-10.8)  x10^3/uL


 


RBC  3.22 L   (4.20-5.40)  10^6/uL


 


Hgb  8.1 L   (12.0-16.0)  g/dL


 


Hct  26.1 L   (37.0-47.0)  %


 


MCV  81.0   (81.0-99.0)  fL


 


MCH  25.2 L   (27.0-31.0)  pg


 


MCHC  31.1 L   (32.0-36.0)  g/dL


 


RDW  17.6 H   (12.0-15.0)  %


 


Plt Count  371   (130-450)  10^3/uL


 


MPV  7.6 L   (7.9-10.8)  fL


 


Neut #  10.4 H   (1.5-6.6)  10^3/uL


 


Lymph #  0.9 L   (1.5-3.5)  10^3/uL


 


Mono #  1.0   (0.0-1.0)  10^3/uL


 


Eos #  0.0   (0.0-0.7)  10^3/uL


 


Baso #  0.0   (0.0-0.1)  10^3/uL


 


Absolute Nucleated RBC  0.00   x10^3/uL


 


Nucleated RBC %  0.0   /100WBC


 


Sodium    (135-145)  mmol/L


 


Potassium    (3.5-5.0)  mmol/L


 


Chloride    (101-111)  mmol/L


 


Carbon Dioxide    (21-32)  mmol/L


 


Anion Gap    (6-13)  


 


BUN    (6-20)  mg/dL


 


Creatinine    (0.4-1.0)  mg/dL


 


Estimated GFR (MDRD)    (>89)  


 


Glucose    ()  mg/dL


 


Calcium    (8.5-10.3)  mg/dL


 


Total Bilirubin    (0.2-1.0)  mg/dL


 


AST    (10-42)  IU/L


 


ALT    (10-60)  IU/L


 


Alkaline Phosphatase    ()  IU/L


 


Total Protein    (6.7-8.2)  g/dL


 


Albumin    (3.2-5.5)  g/dL


 


Globulin    (2.1-4.2)  g/dL


 


Albumin/Globulin Ratio    (1.0-2.2)  


 


Free T4    (0.58-1.64)  ng/dL


 


Free T3 pg/mL    (2.5-3.9)  pg/mL


 


Blood Type   O POSITIVE  


 


Antibody Screen   POSITIVE  


 


Antibody Identification   Anti-K  


 


Crossmatch   See Detail  














Assessment/Plan





- Problem List


(1) Pneumonia


Impression: 


(1) Pneumonia


Impression: 


pt's lung sound is better, WBC is down to 12. If continue to improve, would be d

/c tomorrow.





lung cracker sound indicate pt still has lung infection. Pt present some SOB, 

and elevated WBC


change antibiotic to Cefepime


CXR, follow up


follow up sputum culture


preliminary blood culture negative


continue daily lab, vital monitor





Qualifiers: 


   Pneumonia type: due to unspecified organism   Laterality: right   Lung 

location: middle lobe of lung   Qualified Code(s): J18.1 - Lobar pneumonia, 

unspecified organism   





(2) Anemia


Impression: 


iron deficiency, IV of Ferrilcrit 


continue lab monitor, pt might need another unit of blood 





today pt's HGB 7.2, and pt present some SOB, also pt present some pallor. 

transfusion of one unit of blood with cross match/typing.


it seems pt has iron deficiency from lower MCV.


order iron bid


order iron study


nurse report pt did not have GI bleeding, it is normal color of stool


 





(3) Shortness of breath


Impression: 


resolved





pt state she feel not good today, pt present some SOB. Pt's vital seems not 

indicating of PE, pt did not present distress for PE, will close monitor


order CXR


continue breathing treatment


order D-dimer


change antibiotics to Cefepime


continue vital monitor closely








(4) Essential hypertension


Impression: 


Losartan to 100mg daily


add hydralazine PRN





slightly elevated BP, resume home meds Losartan


vital monitor








(5) Hypothyroidism


Impression: 


TSH is lower at 0.08, change Synth to 75 mcg from 125 mcg





check TSH, follow up








(6) CKD (chronic kidney disease) stage 3, GFR 30-59 ml/min


improved.


Qualifiers: 


   Pneumonia type: due to unspecified organism   Laterality: right   Lung 

location: middle lobe of lung   Qualified Code(s): J18.1 - Lobar pneumonia, 

unspecified organism   





(2) Anemia


Qualifiers: 


   Anemia type: iron deficiency   Iron deficiency anemia type: unspecified iron 

deficiency   Qualified Code(s): D50.9 - Iron deficiency anemia, unspecified

## 2018-04-19 VITALS — SYSTOLIC BLOOD PRESSURE: 123 MMHG | DIASTOLIC BLOOD PRESSURE: 64 MMHG

## 2018-04-19 LAB
ALBUMIN DIAFP-MCNC: 2.2 G/DL (ref 3.2–5.5)
ALBUMIN/GLOB SERPL: 0.6 {RATIO} (ref 1–2.2)
ALP SERPL-CCNC: 96 IU/L (ref 42–121)
ALT SERPL W P-5'-P-CCNC: 28 IU/L (ref 10–60)
ANION GAP SERPL CALCULATED.4IONS-SCNC: 7 MMOL/L (ref 6–13)
AST SERPL W P-5'-P-CCNC: 38 IU/L (ref 10–42)
BASOPHILS NFR BLD AUTO: 0.1 10^3/UL (ref 0–0.1)
BASOPHILS NFR BLD AUTO: 0.9 %
BILIRUB BLD-MCNC: 0.5 MG/DL (ref 0.2–1)
BUN SERPL-MCNC: 18 MG/DL (ref 6–20)
CALCIUM UR-MCNC: 8.4 MG/DL (ref 8.5–10.3)
CHLORIDE SERPL-SCNC: 108 MMOL/L (ref 101–111)
CO2 SERPL-SCNC: 23 MMOL/L (ref 21–32)
CREAT SERPLBLD-SCNC: 1.1 MG/DL (ref 0.4–1)
EOSINOPHIL # BLD AUTO: 0.1 10^3/UL (ref 0–0.7)
EOSINOPHIL NFR BLD AUTO: 0.7 %
ERYTHROCYTE [DISTWIDTH] IN BLOOD BY AUTOMATED COUNT: 17.8 % (ref 12–15)
GFRSERPLBLD MDRD-ARVRAT: 47 ML/MIN/{1.73_M2} (ref 89–?)
GLOBULIN SER-MCNC: 4 G/DL (ref 2.1–4.2)
GLUCOSE SERPL-MCNC: 94 MG/DL (ref 70–100)
HGB UR QL STRIP: 8.2 G/DL (ref 12–16)
LYMPHOCYTES # SPEC AUTO: 1.2 10^3/UL (ref 1.5–3.5)
LYMPHOCYTES NFR BLD AUTO: 13.1 %
MCH RBC QN AUTO: 26.4 PG (ref 27–31)
MCHC RBC AUTO-ENTMCNC: 32.5 G/DL (ref 32–36)
MCV RBC AUTO: 81.1 FL (ref 81–99)
MONOCYTES # BLD AUTO: 1 10^3/UL (ref 0–1)
MONOCYTES NFR BLD AUTO: 10.9 %
NEUTROPHILS # BLD AUTO: 6.9 10^3/UL (ref 1.5–6.6)
NEUTROPHILS # SNV AUTO: 9.3 X10^3/UL (ref 4.8–10.8)
NEUTROPHILS NFR BLD AUTO: 74.4 %
PDW BLD AUTO: 7.7 FL (ref 7.9–10.8)
PLATELET # BLD: 388 10^3/UL (ref 130–450)
PROT SPEC-MCNC: 6.2 G/DL (ref 6.7–8.2)
RBC MAR: 3.09 10^6/UL (ref 4.2–5.4)
SODIUM SERPLBLD-SCNC: 138 MMOL/L (ref 135–145)

## 2018-04-19 RX ADMIN — LOSARTAN POTASSIUM SCH MG: 50 TABLET, FILM COATED ORAL at 08:07

## 2018-04-19 RX ADMIN — POLYETHYLENE GLYCOL 3350 SCH: 17 POWDER, FOR SOLUTION ORAL at 08:07

## 2018-04-19 RX ADMIN — SODIUM CHLORIDE, PRESERVATIVE FREE SCH: 5 INJECTION INTRAVENOUS at 08:07

## 2018-04-19 RX ADMIN — ENOXAPARIN SODIUM SCH MG: 100 INJECTION SUBCUTANEOUS at 08:06

## 2018-04-19 RX ADMIN — SODIUM CHLORIDE SCH MLS/HR: 900 INJECTION INTRAVENOUS at 04:05

## 2018-04-19 RX ADMIN — FAMOTIDINE SCH MG: 20 TABLET, FILM COATED ORAL at 08:07

## 2018-04-19 RX ADMIN — LEVOTHYROXINE SODIUM SCH MCG: 75 TABLET ORAL at 07:06

## 2018-04-19 RX ADMIN — SODIUM CHLORIDE SCH MLS/HR: 900 INJECTION INTRAVENOUS at 13:23

## 2018-04-19 NOTE — DISCHARGE SUMMARY
Discharge Summary


Discharge Date: 04/19/18


Discharging Provider: KATHY LEIGH


Primary Care Provider: Altaf Sidhu


Condition at Discharge: Poor


Discharge Disposition: 03 SNF DC/Xfer


Discharge Facility Name: [facility]





- DIAGNOSES


Admission Diagnoses: 


(1) Pneumonia








(2) Anemia


 





(3) Shortness of breath








(4) Essential hypertension








(5) Hypothyroidism








(6) CKD (chronic kidney disease) stage 3, GFR 30-59 ml/min








Discharge Diagnoses with Status of Each Condition: 


(1) Pneumonia


95% Sats of O2 on room air. no fever, chill, cough. WBC became normal, continue 

to finish the antibiotics course. pt will be managed by PCP


(2) Anemia


pt was found to have profound anemia. nurse found there is no GI bleeding. 

anemia study indicate it seems iron deficiency anemia. Pt had one unit of blood 

transfusion. Pt's HGB is stable. Pt also had once of Ferrilcet infusion. pt is 

prescribed iron to be d/c, and continue to be managed by PCP





(3) Shortness of breath


resolved





(4) Essential hypertension


stable, continue home regime





(5) Hypothyroidism


TSH reveals 0.08 L, indicates overdose of Levothyroxine. Now pt's Levothyroxine 

is 75 mcg from 125 mcg. Pt continue to be managed by PCP





(6) CKD (chronic kidney disease) stage 3, GFR 30-59 ml/min


improved, and stable. continue to be managed by PCP.








- HPI


History of Present Illness: 


refer from 's HPI on 4/15/18 to pt








- HOSPITAL COURSE


Hospital Course: 


pt was admitted for PNA. Pt then was found to have profound anemia. No GI 

bleeding per nurse check. Pt was found SOB. CTA and ECHO were unremarkable. 

After antibiotics treatment, pt's PNA is controlled. Room air with 95% Sats of 

O2, no fever, cough, chill. WBC is normal after treatment. Pt was transfusion 

of one unit of blood. Anemia study indicate iron deficiency. Ferrlicit was 

infused. Iron was prescribed to pt for d/c. Pt's HGB is stable. pt was 

prescribed antibiotics Augmentin to finish the antibiotics course.








- ALLERGIES


Allergies/Adverse Reactions: 


 Allergies











Allergy/AdvReac Type Severity Reaction Status Date / Time


 


hydrocodone AdvReac Unknown Unknown Verified 04/14/18 19:16














- MEDICATIONS


Home Medications: 


 Ambulatory Orders











 Medication  Instructions  Recorded  Confirmed


 


Famotidine 20 mg PO DAILY #30 tablet 09/19/16 04/15/18


 


Simvastatin 20 mg ORAL QPM 12/25/16 04/15/18


 


Valsartan 40 mg ORAL Q2D 12/25/16 04/15/18


 


Amox/Clav 875/125 [Augmentin] 1 each PO Q12H #10 tablet 04/19/18 


 


Ferrous Sulfate 325 mg PO DAILY #15 tablet 04/19/18 


 


Levothyroxine [Synthroid] 75 mcg PO QDAC #7 tablet 04/19/18 














- PHYSICAL EXAM AT DISCHARGE


General Appearance: positive: No acute distress, Alert.  negative: Lethargic


Eyes Bilateral: positive: Normal inspection, PERRL, No lid inflammation, 

Conjunctivae nml


ENT: positive: ENT inspection nml, Pharynx nml, No signs of dehydration.  

negative: Purulent nasal drainage, Pharyngeal erythema, Oral lesions


Neck: positive: Nml inspection, Thyroid nml, No JVD, Trachea midline.  negative

: Thyromegaly, Lymphadenopathy (R), Lymphadenopathy (L), Stiff neck, Carotid 

bruit, Swelling/bruising, Tracheal deviation


Respiratory: positive: Chest non-tender, No respiratory distress, Breath sounds 

nml.  negative: Wheezes, Rales, Rhonchi


Cardiovascular: positive: Regular rate & rhythm, No murmur, No gallop.  negative

: Irregularly irregular, Extrasystoles, Tachycardia, Bradycardia, Systolic 

murmur, Diastolic murmur


Peripheral Pulses: positive: 2+


Abdomen: positive: Non-tender, No organomegaly, Nml bowel sounds, No 

distention.  negative: Tenderness, Guarding, Rebound


Back: positive: Nml inspection.  negative: CVA tenderness (R), CVA tenderness (L

)


Skin: positive: Color nml, No rash, Warm, Dry.  negative: Cyanosis, Diaphoresis

, Pallor


Extremities: positive: Non-tender, Full ROM, Nml appearance.  negative: Calf 

tenderness, Joint swelling, Myesha's sign/cords


Neurologic/Psychiatric: positive: Sensation nml.  negative: Sensory loss, 

Facial droop, Slurred/abnml speech, Depressed mood/affect





- LABS


Result Diagrams: 


 04/19/18 04:50





 04/19/18 04:50





- FOLLOW UP


Follow Up: 


May see admission provider when pt arrival to facility, may have CBC and TSH 

test after reviewed by provider at facility, continue meds as the schedule.








- TIME SPENT


Time Spent in Discharge (Minutes): 55

## 2018-04-19 NOTE — DISCHARGE PLAN
Discharge Plan


Disposition: 06 Home Health Service


Condition: Poor


Prescriptions: 


Amox/Clav 875/125 [Augmentin] 1 each PO Q12H #10 tablet


Ferrous Sulfate 325 mg PO DAILY #15 tablet


Levothyroxine [Synthroid] 75 mcg PO QDAC #7 tablet


Diet: Regular


Activity Restrictions: Activity as Tolerated


Shower Restrictions: No (caregiver closely monitor, fall precaution)


Driving Restrictions: Yes


Assistance Devices: Walker


Weight Bearing: Full Weight


Instruction Topics:  ED Hypothyroidism, Amoxicillin Clavulanic Acid tablets, 

Pneumonia, ED Anemia Iron Deficiency, Iron tablets capsules extended-release 

tablets


Additional Instructions or Follow Up instructions: 


May see PCP in 2-3 days. Pt's lab test reveals significant lower TSH, may 

reduce Levothyroxine to 75mcg daily. Should symptoms return or worsen, may 

present ER or call 911 for help.


Follow-Up Care: Home Health - PT, Home Health - OT


No Smoking: If you smoke, Please STOP!  Call [phone] for help.


Follow-up with: 


Altaf Almendarez MD [Physician No Access] -

## 2018-04-19 NOTE — DISCHARGE PLAN
Discharge Plan for SNF / [facility]





- DC Plan and Transition Orders


Disposition: 03 SNF DC/Xfer


Condition: Poor


SNF Transition Orders: 





Admit to: [Memory care ] under the care of [Doctor Altaf Almendarez]





Discharge Diagnosis:  


[Pneumonia, anemia, dementia, HTN, Hypothyroidism, CKD]





Medicare Certification: I do not certify that Post Hospital skilled nursing 

care is medically necessary on a continuing basis for any of the conditions for 

which she/he is receiving care during hospitalization.





 Notify PCP of admission and forward orders to primary provider for signature.





Weight on admission and [67kg].  Call PCP immediately if weight increases by [4

] pounds or if patient develops dyspnea, chest pain/tightness or edema.





House Bowel Program: [Yes]


If no BM after 2 days, nurse may give M.O.M. 30ml PO PRN and /or ducolax Supp 1 

MO and /or DEEPAK 250mg P.O., and/or senna 1-2 tabs PO.  On day 3 nurse may give 

repeat above order until residents constipation is resolved. 





Immunizations:





Annual Influenza Vaccine: [Yes].


(between Sept 1st and March 31st.) Unless allergy or already given





Two-Step PPD: [Yes]


per [facility] [phone] or appropriate documentation of approved exceptions


   


Treatments & Other Orders: [May see admission provider when pt arrival to 

facility. Pt's lab test reveals significant lower TSH, may reduce Levothyroxine 

to 75mcg daily.]   





Oxygen Orders: [PRN]      





Lab Tests or X-Rays Orders: [CBC and TSH in one week]





Orthopedic Orders: [].








Medications:





PLEASE REFER TO THE DISCHARGE MEDICATION LIST.








Insulin Orders? [No]





Diagnosis: Diabetes


Initiate hypo and hyperglycemia protocols for BG <70 and BG >375.  May check BG 

prn for signs/symptoms of dysglycemia.





Frequency of BG checks: [AC/Meal/HS]





Basal Insulin:


   


   [] Lantus  100 units / ml inject subq as follows: []


   [] Other: []





Correction Insulin: -  Select the type of insulin below





   [Choose: Novolog/Humalog]100 units /ml insulin inject subq per orders 

indicate below














[] LOW DOSE


  [] MODERATE DOSE


  [] MODERATE/HIGH     


       DOSE [] HIGH DOSE


 


 


    GB               UNITS       GB               UNITS         GB             

  UNITS    GB               UNITS


 


 


         0 UNITS          0 UNITS          0 UNITS     

     0 UNITS


 


 


141-175       1 UNITS 141-175       1 UNITS 141-175       2 UNITS 141-175       

3 UNITS


 


 


176-225       2 UNITS 176-225       3 UNITS 176-225       4 UNITS 176-225       

5 UNITS


 


 


226-275       3 UNITS 226-275       5 UNITS 226-275       6 UNITS 226-275       

7 UNITS


 


 


276-325       4 UNITS 276-325       7 UNITS 276-325       8 UNITS 276-325       

9 UNITS


 


 


326-375       5 UNITS 326-375       9 UNITS 326-375      10 UNITS 326-375      

11 UNITS


 


 


>375    CONTACT MD >375    CONTACT MD >375    CONTACT MD >375    CONTACT MD


 











Custom Dosing: 





   [Choose: None/Novolog/Humalog] 100 units/ml Insulin inject subq as follows:











   GB    Units


 


 [] Units


 


141-175 [] Units


 


176-225 [] Units


 


226-275 [] Units


 


276-325 []Units


 


326-375 [] Units


 


>375 Contact MD














Allergies and Adverse Reactions: 


 Allergies











Allergy/AdvReac Type Severity Reaction Status Date / Time


 


hydrocodone AdvReac Unknown Unknown Verified 04/14/18 19:16














- Medications


New Prescriptions: 


Amox/Clav 875/125 [Augmentin] 1 each PO Q12H #10 tablet


Ferrous Sulfate 325 mg PO DAILY #15 tablet


Levothyroxine [Synthroid] 75 mcg PO QDAC #7 tablet





- Diet


Type: Geriatric


Texture: Regular


Liquids: Thin


May have monthly special meal: Yes





- Therapies | Activity


Therapy: Evaluation | Treat if indicated: PT, OT


Rehabilitation Potential: Maximize functional status


Activity: Activity as Tolerated


Weight Bearing: Full Weight


Assistance Devices: Walker


Additional Instructions: 


May see admission provider when arrival to the facility. Pt's lab test reveals 

significant lower TSH, may reduce Levothyroxine to 75mcg daily. Should symptoms 

return or worsen, may present ER or call 911 for help.